# Patient Record
Sex: FEMALE | Race: BLACK OR AFRICAN AMERICAN | NOT HISPANIC OR LATINO | Employment: UNEMPLOYED | URBAN - METROPOLITAN AREA
[De-identification: names, ages, dates, MRNs, and addresses within clinical notes are randomized per-mention and may not be internally consistent; named-entity substitution may affect disease eponyms.]

---

## 2018-08-17 ENCOUNTER — TELEPHONE (OUTPATIENT)
Dept: OBSTETRICS AND GYNECOLOGY | Facility: CLINIC | Age: 23
End: 2018-08-17

## 2018-08-17 DIAGNOSIS — Z34.90 PREGNANCY, UNSPECIFIED GESTATIONAL AGE: Primary | ICD-10-CM

## 2018-08-17 NOTE — TELEPHONE ENCOUNTER
----- Message from Promise Buckley sent at 8/17/2018  1:54 PM CDT -----  Contact: self   Pt is calling to schedule a new pregnancy appt. LMP:07/01/18 PPT:08/14/18. the pt can be reached at [phone].

## 2018-09-05 ENCOUNTER — OFFICE VISIT (OUTPATIENT)
Dept: OBSTETRICS AND GYNECOLOGY | Facility: CLINIC | Age: 23
End: 2018-09-05
Payer: MEDICAID

## 2018-09-05 ENCOUNTER — LAB VISIT (OUTPATIENT)
Dept: LAB | Facility: OTHER | Age: 23
End: 2018-09-05
Payer: MEDICAID

## 2018-09-05 VITALS
BODY MASS INDEX: 39.67 KG/M2 | DIASTOLIC BLOOD PRESSURE: 70 MMHG | HEIGHT: 65 IN | WEIGHT: 238.13 LBS | SYSTOLIC BLOOD PRESSURE: 100 MMHG

## 2018-09-05 DIAGNOSIS — O99.211 OBESITY AFFECTING PREGNANCY IN FIRST TRIMESTER: ICD-10-CM

## 2018-09-05 DIAGNOSIS — Z32.01 POSITIVE PREGNANCY TEST: ICD-10-CM

## 2018-09-05 DIAGNOSIS — N91.2 AMENORRHEA: Primary | ICD-10-CM

## 2018-09-05 DIAGNOSIS — N91.2 AMENORRHEA: ICD-10-CM

## 2018-09-05 DIAGNOSIS — O98.111 SYPHILIS AFFECTING PREGNANCY IN FIRST TRIMESTER: ICD-10-CM

## 2018-09-05 LAB
ABO + RH BLD: NORMAL
ANION GAP SERPL CALC-SCNC: 12 MMOL/L
B-HCG UR QL: POSITIVE
BASOPHILS # BLD AUTO: 0 K/UL
BASOPHILS NFR BLD: 0 %
BLD GP AB SCN CELLS X3 SERPL QL: NORMAL
BUN SERPL-MCNC: 6 MG/DL
CALCIUM SERPL-MCNC: 9.8 MG/DL
CHLORIDE SERPL-SCNC: 105 MMOL/L
CO2 SERPL-SCNC: 19 MMOL/L
CREAT SERPL-MCNC: 0.7 MG/DL
CTP QC/QA: YES
DIFFERENTIAL METHOD: NORMAL
EOSINOPHIL # BLD AUTO: 0.1 K/UL
EOSINOPHIL NFR BLD: 1 %
ERYTHROCYTE [DISTWIDTH] IN BLOOD BY AUTOMATED COUNT: 13.2 %
EST. GFR  (AFRICAN AMERICAN): >60 ML/MIN/1.73 M^2
EST. GFR  (NON AFRICAN AMERICAN): >60 ML/MIN/1.73 M^2
ESTIMATED AVG GLUCOSE: 103 MG/DL
GLUCOSE SERPL-MCNC: 78 MG/DL
HBA1C MFR BLD HPLC: 5.2 %
HCT VFR BLD AUTO: 37.4 %
HGB BLD-MCNC: 12.5 G/DL
LYMPHOCYTES # BLD AUTO: 1.4 K/UL
LYMPHOCYTES NFR BLD: 21.1 %
MCH RBC QN AUTO: 29.3 PG
MCHC RBC AUTO-ENTMCNC: 33.4 G/DL
MCV RBC AUTO: 88 FL
MONOCYTES # BLD AUTO: 0.6 K/UL
MONOCYTES NFR BLD: 8.8 %
NEUTROPHILS # BLD AUTO: 4.7 K/UL
NEUTROPHILS NFR BLD: 68.8 %
PLATELET # BLD AUTO: 321 K/UL
PMV BLD AUTO: 10.1 FL
POTASSIUM SERPL-SCNC: 3.6 MMOL/L
RBC # BLD AUTO: 4.26 M/UL
SODIUM SERPL-SCNC: 136 MMOL/L
WBC # BLD AUTO: 6.84 K/UL

## 2018-09-05 PROCEDURE — 86703 HIV-1/HIV-2 1 RESULT ANTBDY: CPT

## 2018-09-05 PROCEDURE — 99203 OFFICE O/P NEW LOW 30 MIN: CPT | Mod: TH,S$PBB,, | Performed by: NURSE PRACTITIONER

## 2018-09-05 PROCEDURE — 85025 COMPLETE CBC W/AUTO DIFF WBC: CPT

## 2018-09-05 PROCEDURE — 36415 COLL VENOUS BLD VENIPUNCTURE: CPT

## 2018-09-05 PROCEDURE — 83021 HEMOGLOBIN CHROMOTOGRAPHY: CPT

## 2018-09-05 PROCEDURE — 87340 HEPATITIS B SURFACE AG IA: CPT

## 2018-09-05 PROCEDURE — 87491 CHLMYD TRACH DNA AMP PROBE: CPT

## 2018-09-05 PROCEDURE — 81025 URINE PREGNANCY TEST: CPT | Mod: PBBFAC | Performed by: NURSE PRACTITIONER

## 2018-09-05 PROCEDURE — 87086 URINE CULTURE/COLONY COUNT: CPT

## 2018-09-05 PROCEDURE — 86592 SYPHILIS TEST NON-TREP QUAL: CPT

## 2018-09-05 PROCEDURE — 83036 HEMOGLOBIN GLYCOSYLATED A1C: CPT

## 2018-09-05 PROCEDURE — 99213 OFFICE O/P EST LOW 20 MIN: CPT | Mod: PBBFAC,TH,25 | Performed by: NURSE PRACTITIONER

## 2018-09-05 PROCEDURE — 86762 RUBELLA ANTIBODY: CPT

## 2018-09-05 PROCEDURE — 80048 BASIC METABOLIC PNL TOTAL CA: CPT

## 2018-09-05 PROCEDURE — 99999 PR PBB SHADOW E&M-EST. PATIENT-LVL III: CPT | Mod: PBBFAC,,, | Performed by: NURSE PRACTITIONER

## 2018-09-05 PROCEDURE — 86850 RBC ANTIBODY SCREEN: CPT

## 2018-09-05 NOTE — PROGRESS NOTES
"  CC: Positive Pregnancy Test    HISTORY OF PRESENT ILLNESS:    Noemí Elias is a 23 y.o. female, ,  Presents today for a routine exam complaining of amenorrhea and positive home urine pregnancy test.  Patient's last menstrual period was 2018. (approximate date). Hx of irregular periods. Reports LMP was "weird and only spotting". Very excited about pregnancy but also nervous. This is her first pregnancy. Plans to see Dr. Reynolds for the pregnancy per her PCP recommendations. Recently seen at her PCP office in [facility]. She had a full STD panel and pap smear done. Dx with syphilis. She and her SO are in process of being treated. She received her second injection yesterday, the third is scheduled for next week. Denies hx of other STDs. Pap smear was normal. Only taking a prenatal vitamin. Does not smoke cigarettes. Denies PMH or PSH. Denies nausea. Reports breast tenderness. Denies pelvic pain.    LMP: 18  EGA: 9w6d  EDC: 19    ROS:  GENERAL: No weight changes. No swelling. No fatigue. No fever.  CARDIOVASCULAR: No chest pain. No shortness of breath. No leg cramps.   NEUROLOGICAL: No headaches. No vision changes.  BREASTS: No pain. No lumps. No discharge.  ABDOMEN: No pain. No diarrhea. No constipation.  REPRODUCTIVE: No abnormal bleeding.   VULVA: No pain. No lesions. No itching.  VAGINA: No relaxation. No itching. No odor. No discharge. No lesions.  URINARY: No incontinence. No nocturia. No frequency. No dysuria.    MEDICATIONS AND ALLERGIES:  Reviewed    COMPREHENSIVE GYN HISTORY:  PAP History: Denies abnormal Paps.  Infection History:syphilis. Denies PID.  Benign History: Denies uterine fibroids. Denies ovarian cysts. Denies endometriosis. Denies other conditions.  Cancer History: Denies cervical cancer. Denies uterine cancer or hyperplasia. Denies ovarian cancer. Denies vulvar cancer or pre-cancer. Denies vaginal cancer or pre-cancer. Denies breast cancer. Denies colon cancer.  Sexual Activity " "History: Reports currently being sexually active  Menstrual History: None.  Contraception: None    /70   Ht 5' 5" (1.651 m)   Wt 108 kg (238 lb 1.6 oz)   LMP 2018   BMI 39.62 kg/m²     PE:  AFFECT: Calm, alert and oriented X 3. Interactive during exam  GENERAL: Appears well-nourished, well-developed, in no acute distress.  HEAD: Normocephalic, atruamatic  TEETH: Good dentition.  THYROID: No thyromegally   BREASTS: No masses, skin changes, nipple discharge or adenopathy bilaterally.  SKIN: Normal for race, warm, & dry. No lesions or rashes.  LUNGS: Easy and unlabored, clear to auscultation bilaterally.  HEART: Regular rate and rhythm   ABDOMEN: Soft and nontender without masses or organomegally.  VULVA: No lesions, masses or tenderness.  VAGINA: Moist and well rugated without lesions or discharge.  CERVIX: Moist and pink without lesions, discharge or tenderness.      UTERUS SIZE: 8 week size, nontender and without masses.  ADNEXA: No masses or tenderness.  ESTIMATE OF PELVIC CAPACITY: Adequate  EXTREMITIES: No cyanosis, clubbing or edema. No calf tenderness.  LYMPH NODES: No axillary or inguinal adenopathy.    PROCEDURES:  UPT Positive  Genprobe  Pap current, 2018 WNL per pt report    ASSESSMENT/PLAN:  Amenorrhea  Positive urine pregnancy test (TOAN: 19, EGA: 9w6d based on LMP)    -  Routine prenatal care    Nausea and vomiting in pregnancy    -  Education regarding lifestyle and dietary modifications    -  Advised use of B6/Unisom. Pt will notify us if no relief/worsening symptoms, will consider Zofran if needed.    1st TRIMESTER COUNSELING: Discussed all, booklet provided:  Common complaints of pregnancy  HIV and other routine prenatal tests including  genetic screening  Risk factors identified by prenatal history  Oriented to practice - discussed anticipated course of prenatal care & indications for Ultrasound  Childbirth classes/Hospital facilities   Nutrition and weight gain " counseling  Toxoplasmosis precautions (Cats/Raw Meat)  Sexual activity and exercise  Environmental/Work hazards  Travel  Tobacco (Ask, Advise, Assess, Assist, and Arrange), as well as alcohol and drug use  Use of any medications (Including supplements, Vitamins, Herbs, or OTC Drugs)  Domestic violence  Seat belt use    FOLLOW-UP in 4 weeks with Dr. Rodolfo Powell today, u/s tomorrow  Pap current  Release of records initiated today  Continue with OTC prenatal  PCN injection #3 next week     Tia Sanchez NP  OB/GYN

## 2018-09-06 LAB
C TRACH DNA SPEC QL NAA+PROBE: NOT DETECTED
HBV SURFACE AG SERPL QL IA: NEGATIVE
HIV 1+2 AB+HIV1 P24 AG SERPL QL IA: NEGATIVE
N GONORRHOEA DNA SPEC QL NAA+PROBE: NOT DETECTED
RPR SER QL: NORMAL
RUBV IGG SER-ACNC: 22.6 IU/ML
RUBV IGG SER-IMP: REACTIVE

## 2018-09-07 ENCOUNTER — TELEPHONE (OUTPATIENT)
Dept: OBSTETRICS AND GYNECOLOGY | Facility: CLINIC | Age: 23
End: 2018-09-07

## 2018-09-07 ENCOUNTER — PROCEDURE VISIT (OUTPATIENT)
Dept: OBSTETRICS AND GYNECOLOGY | Facility: CLINIC | Age: 23
End: 2018-09-07
Payer: MEDICAID

## 2018-09-07 DIAGNOSIS — Z34.90 PREGNANCY, UNSPECIFIED GESTATIONAL AGE: ICD-10-CM

## 2018-09-07 LAB
BACTERIA UR CULT: NORMAL
HGB A2 MFR BLD HPLC: 2.9 %
HGB FRACT BLD ELPH-IMP: NORMAL
HGB FRACT BLD ELPH-IMP: NORMAL

## 2018-09-07 PROCEDURE — 76801 OB US < 14 WKS SINGLE FETUS: CPT | Mod: 26,S$PBB,, | Performed by: OBSTETRICS & GYNECOLOGY

## 2018-09-07 PROCEDURE — 76801 OB US < 14 WKS SINGLE FETUS: CPT | Mod: PBBFAC | Performed by: OBSTETRICS & GYNECOLOGY

## 2018-09-07 NOTE — TELEPHONE ENCOUNTER
----- Message from Trinidad Escalona sent at 9/7/2018 11:34 AM CDT -----  Contact: Mariama  Name of Who is Calling: Mariama     What is the request in detail: Patients mother Mariama is requesting to speak with the staff in regards to getting a new set of U/S pics from the patients dating U/S visit today she states they misplaced them on the way home......Please contact to further discuss and advise     Can the clinic reply by [facility]: No      What Number to Call Back if not in [facility]: [phone]

## 2018-10-02 ENCOUNTER — INITIAL PRENATAL (OUTPATIENT)
Dept: OBSTETRICS AND GYNECOLOGY | Facility: CLINIC | Age: 23
End: 2018-10-02
Payer: MEDICAID

## 2018-10-02 VITALS — BODY MASS INDEX: 40.36 KG/M2 | WEIGHT: 242.5 LBS | SYSTOLIC BLOOD PRESSURE: 100 MMHG | DIASTOLIC BLOOD PRESSURE: 60 MMHG

## 2018-10-02 DIAGNOSIS — Z34.02 ENCOUNTER FOR SUPERVISION OF NORMAL FIRST PREGNANCY IN SECOND TRIMESTER: Primary | ICD-10-CM

## 2018-10-02 DIAGNOSIS — F12.91 HISTORY OF MARIJUANA USE: ICD-10-CM

## 2018-10-02 LAB
AMPHET+METHAMPHET UR QL: NEGATIVE
BARBITURATES UR QL SCN>200 NG/ML: NEGATIVE
BENZODIAZ UR QL SCN>200 NG/ML: NEGATIVE
BZE UR QL SCN: NEGATIVE
CANNABINOIDS UR QL SCN: NORMAL
CREAT UR-MCNC: 141 MG/DL
ETHANOL UR-MCNC: <10 MG/DL
METHADONE UR QL SCN>300 NG/ML: NEGATIVE
OPIATES UR QL SCN: NEGATIVE
PCP UR QL SCN>25 NG/ML: NEGATIVE
TOXICOLOGY INFORMATION: NORMAL

## 2018-10-02 PROCEDURE — 99212 OFFICE O/P EST SF 10 MIN: CPT | Mod: PBBFAC,TH | Performed by: OBSTETRICS & GYNECOLOGY

## 2018-10-02 PROCEDURE — 99999 PR PBB SHADOW E&M-EST. PATIENT-LVL II: CPT | Mod: PBBFAC,,, | Performed by: OBSTETRICS & GYNECOLOGY

## 2018-10-02 PROCEDURE — 80307 DRUG TEST PRSMV CHEM ANLYZR: CPT

## 2018-10-02 PROCEDURE — 99213 OFFICE O/P EST LOW 20 MIN: CPT | Mod: TH,S$PBB,, | Performed by: OBSTETRICS & GYNECOLOGY

## 2018-10-02 NOTE — PROGRESS NOTES
Labs reviewed. Patient reports feeling well. Repost NICOLE use 1 week ago, infomed patient that tox screen would be performed.

## 2018-10-11 ENCOUNTER — TELEPHONE (OUTPATIENT)
Dept: OBSTETRICS AND GYNECOLOGY | Facility: CLINIC | Age: 23
End: 2018-10-11

## 2018-10-11 NOTE — TELEPHONE ENCOUNTER
----- Message from [place] sent at 10/11/2018  9:37 AM CDT -----  PLEASE CALL 15 WKS OB PT THIS MORNING PT WAS VOMITING AND SHE SAW BLOOD [phone]

## 2018-10-11 NOTE — TELEPHONE ENCOUNTER
I spoke to the pt and gave her the pre cautions per Dr Reynolds  About vomiting and  If she does more than a 24 hours period  she needs to come into labor and delivery . I advised the pt to eat six small meals and drink her fluids slowly and not gulp it down.  Pt states she only threw up once today with a streak of blood. Pt was told to monitor and if the condition gets worse to come into labor and delivery to be seen.

## 2018-10-16 NOTE — TELEPHONE ENCOUNTER
----- Message from Ernestine Amezcua sent at 10/16/2018  9:52 AM CDT -----  Contact: Pt   Name of Who is Calling: ANDREW GUTIERREZ [[id number]]    What is the request in detail: Pt states that her generic Zofran nausea medicine isn't working for her and it gave her headaches. She states she took Promethizine 25 MG and it worked and she would like a prescription for it. Please advise.    Can the clinic reply by [facility]: No     [facility]: [phone]

## 2018-10-16 NOTE — TELEPHONE ENCOUNTER
Patient states zofran makes her constipated and gives her headaches. Patient requesting rx for promethazine.

## 2018-10-17 ENCOUNTER — TELEPHONE (OUTPATIENT)
Dept: OBSTETRICS AND GYNECOLOGY | Facility: CLINIC | Age: 23
End: 2018-10-17

## 2018-10-17 RX ORDER — PROMETHAZINE HYDROCHLORIDE 25 MG/1
25 TABLET ORAL EVERY 4 HOURS
Qty: 30 TABLET | Refills: 0 | Status: SHIPPED | OUTPATIENT
Start: 2018-10-17 | End: 2018-11-28 | Stop reason: SDUPTHER

## 2018-10-17 NOTE — TELEPHONE ENCOUNTER
----- Message from Fernanda Fields MA sent at 10/17/2018 10:56 AM CDT -----  Contact: Self  Pt is returning a phone call. The pt can be reached at [phone].  Thanks FL

## 2018-10-17 NOTE — TELEPHONE ENCOUNTER
Left message to patient to call the office at [phone].  Informed patient medication was sent to the pharmacy.

## 2018-10-31 ENCOUNTER — ROUTINE PRENATAL (OUTPATIENT)
Dept: OBSTETRICS AND GYNECOLOGY | Facility: CLINIC | Age: 23
End: 2018-10-31
Payer: MEDICAID

## 2018-10-31 ENCOUNTER — HOSPITAL ENCOUNTER (EMERGENCY)
Facility: OTHER | Age: 23
Discharge: HOME | End: 2018-10-31
Attending: OBSTETRICS & GYNECOLOGY | Admitting: OBSTETRICS & GYNECOLOGY
Payer: MEDICAID

## 2018-10-31 ENCOUNTER — TELEPHONE (OUTPATIENT)
Dept: OBSTETRICS AND GYNECOLOGY | Facility: CLINIC | Age: 23
End: 2018-10-31

## 2018-10-31 VITALS
TEMPERATURE: 99 F | SYSTOLIC BLOOD PRESSURE: 116 MMHG | HEART RATE: 73 BPM | RESPIRATION RATE: 20 BRPM | DIASTOLIC BLOOD PRESSURE: 75 MMHG | OXYGEN SATURATION: 98 %

## 2018-10-31 VITALS
DIASTOLIC BLOOD PRESSURE: 60 MMHG | SYSTOLIC BLOOD PRESSURE: 100 MMHG | WEIGHT: 238.13 LBS | BODY MASS INDEX: 39.62 KG/M2

## 2018-10-31 DIAGNOSIS — Z34.02 ENCOUNTER FOR SUPERVISION OF NORMAL FIRST PREGNANCY IN SECOND TRIMESTER: Primary | ICD-10-CM

## 2018-10-31 DIAGNOSIS — Z3A.17 17 WEEKS GESTATION OF PREGNANCY: ICD-10-CM

## 2018-10-31 DIAGNOSIS — O21.9 NAUSEA AND VOMITING OF PREGNANCY, ANTEPARTUM: Primary | ICD-10-CM

## 2018-10-31 DIAGNOSIS — O26.892 HEADACHE IN PREGNANCY, ANTEPARTUM, SECOND TRIMESTER: ICD-10-CM

## 2018-10-31 DIAGNOSIS — R51.9 HEADACHE IN PREGNANCY, ANTEPARTUM, SECOND TRIMESTER: ICD-10-CM

## 2018-10-31 PROCEDURE — 99212 OFFICE O/P EST SF 10 MIN: CPT | Mod: PBBFAC,TH | Performed by: OBSTETRICS & GYNECOLOGY

## 2018-10-31 PROCEDURE — 25000003 PHARM REV CODE 250: Performed by: OBSTETRICS & GYNECOLOGY

## 2018-10-31 PROCEDURE — 99284 EMERGENCY DEPT VISIT MOD MDM: CPT | Mod: ,,, | Performed by: OBSTETRICS & GYNECOLOGY

## 2018-10-31 PROCEDURE — 99283 EMERGENCY DEPT VISIT LOW MDM: CPT | Mod: 27

## 2018-10-31 PROCEDURE — 99213 OFFICE O/P EST LOW 20 MIN: CPT | Mod: TH,S$PBB,, | Performed by: OBSTETRICS & GYNECOLOGY

## 2018-10-31 PROCEDURE — 25000003 PHARM REV CODE 250: Performed by: STUDENT IN AN ORGANIZED HEALTH CARE EDUCATION/TRAINING PROGRAM

## 2018-10-31 PROCEDURE — 99999 PR PBB SHADOW E&M-EST. PATIENT-LVL II: CPT | Mod: PBBFAC,,, | Performed by: OBSTETRICS & GYNECOLOGY

## 2018-10-31 RX ORDER — ONDANSETRON 8 MG/1
8 TABLET, ORALLY DISINTEGRATING ORAL ONCE
Status: DISCONTINUED | OUTPATIENT
Start: 2018-10-31 | End: 2018-10-31

## 2018-10-31 RX ORDER — ACETAMINOPHEN 500 MG
1000 TABLET ORAL EVERY 6 HOURS PRN
Status: DISCONTINUED | OUTPATIENT
Start: 2018-10-31 | End: 2018-10-31

## 2018-10-31 RX ORDER — BUTALBITAL, ACETAMINOPHEN AND CAFFEINE 50; 325; 40 MG/1; MG/1; MG/1
1 TABLET ORAL EVERY 6 HOURS PRN
Qty: 40 TABLET | Refills: 0 | Status: SHIPPED | OUTPATIENT
Start: 2018-10-31 | End: 2018-11-28 | Stop reason: SDUPTHER

## 2018-10-31 RX ORDER — METOCLOPRAMIDE 10 MG/1
10 TABLET ORAL ONCE
Status: COMPLETED | OUTPATIENT
Start: 2018-10-31 | End: 2018-10-31

## 2018-10-31 RX ORDER — BUTALBITAL, ACETAMINOPHEN AND CAFFEINE 50; 325; 40 MG/1; MG/1; MG/1
1 TABLET ORAL EVERY 6 HOURS PRN
Qty: 40 TABLET | Refills: 0 | Status: SHIPPED | OUTPATIENT
Start: 2018-10-31 | End: 2018-10-31 | Stop reason: SDUPTHER

## 2018-10-31 RX ORDER — METOCLOPRAMIDE 10 MG/1
10 TABLET ORAL EVERY 6 HOURS
Qty: 30 TABLET | Refills: 0 | Status: SHIPPED | OUTPATIENT
Start: 2018-10-31 | End: 2018-10-31 | Stop reason: CLARIF

## 2018-10-31 RX ORDER — BUTALBITAL, ACETAMINOPHEN AND CAFFEINE 50; 325; 40 MG/1; MG/1; MG/1
2 TABLET ORAL EVERY 6 HOURS PRN
Status: DISCONTINUED | OUTPATIENT
Start: 2018-10-31 | End: 2018-10-31 | Stop reason: HOSPADM

## 2018-10-31 RX ADMIN — BUTALBITAL, ACETAMINOPHEN AND CAFFEINE 2 TABLET: 50; 325; 40 TABLET ORAL at 06:10

## 2018-10-31 RX ADMIN — METOCLOPRAMIDE HYDROCHLORIDE 10 MG: 10 TABLET ORAL at 06:10

## 2018-10-31 NOTE — TELEPHONE ENCOUNTER
I spoke to the pt and  Scheduled her an apt to come in and be seen for throwing up blood and severe headaches that do not go away.

## 2018-10-31 NOTE — ED PROVIDER NOTES
Encounter Date: 10/31/2018       History     Chief Complaint   Patient presents with    Nausea     Noemí Elias is a 23 y.o. F at 17w6d presents complaining of nausea and emesis x2 days. Patient states that on 10/30 she had 4 episodes of emesis. She has had 3 episodes of emesis today, the last being around 1500.  Her n/v is unrelieved with phenergan. She also states that she developed a mild frontal headache yesterday that is unrelieved with tylenol. She continues to have a headache today and last took tylenol early this morning. She is worried due to a very small amount of bright red blood in her emesis yesterday that she states is now about a tablespoon's worth. She reports one episode of diarrhea this morning. She last ate dinner on 10/29 and has not been able to keep food down since. Currently in the [facility], she denies feeling nauseous and only complains of slight headache. She denies fever, chills, chest pain, shortness of breath, and dysuria. She states that she cannot take zofran due to side effects of constipation and headache.    This IUP is complicated by treatment of syphilis in the 1st trimester, morbid obesity, and history of marijuana use in the 1st trimester.  Patient denies contractions, denies vaginal bleeding, denies LOF.   Fetal Movement: normal.            Review of patient's allergies indicates:   Allergen Reactions    Zithromax [azithromycin]      No past medical history on file.  No past surgical history on file.  Family History   Problem Relation Age of Onset    Breast cancer Mother     Colon cancer Neg Hx     Ovarian cancer Neg Hx      Social History     Tobacco Use    Smoking status: Former Smoker    Smokeless tobacco: Former User   Substance Use Topics    Alcohol use: No     Frequency: Never    Drug use: Yes     Types: Marijuana     Review of Systems   Constitutional: Negative for activity change, appetite change, chills, fatigue and fever.   HENT: Negative for congestion and  rhinorrhea.    Eyes: Negative for visual disturbance.   Respiratory: Negative for cough and shortness of breath.    Cardiovascular: Negative for chest pain and palpitations.   Gastrointestinal: Positive for diarrhea, nausea and vomiting. Negative for abdominal pain.   Genitourinary: Negative for dysuria, pelvic pain and vaginal bleeding.   Musculoskeletal: Negative for back pain.   Neurological: Positive for headaches. Negative for dizziness.   Psychiatric/Behavioral: Negative for agitation.       Physical Exam     Initial Vitals   BP Pulse Resp Temp SpO2   10/31/18 1711 10/31/18 1711 10/31/18 1717 10/31/18 1710 10/31/18 1712   117/72 85 20 99 °F (37.2 °C) 99 %      MAP       --                Physical Exam    Vitals reviewed.  Constitutional: She appears well-developed and well-nourished. No distress.   Cardiovascular: Normal rate, regular rhythm and normal heart sounds.   Pulmonary/Chest: No respiratory distress.   Abdominal: Soft. There is no tenderness. There is no rebound and no guarding.   Gravid. Fundus NT.   Musculoskeletal: She exhibits no edema or tenderness.   Neurological: She is alert and oriented to person, place, and time.   Skin: Skin is warm and dry.   Psychiatric: She has a normal mood and affect. Her behavior is normal. Judgment and thought content normal.         ED Course   Procedures   FETAL HEART TONES VERIFIED  Labs Reviewed - No data to display       Imaging Results    None          Medical Decision Making:   ED Management:  - VSS  - FHTs in 150s  - U dip: negative  - Tylenol 1g PO given for headache  - Not currently feeling nauseous, will PO challenge  - 1 episode of emesis after eating part of a ros cracker and drinking sprite  - Prior to administering reglan, pt was eating chips and felt better. Nausea/vomiting resolved  - fioricet for headache w complete relief  - dc home w rx for fioricet              Attending Attestation:   Physician Attestation Statement for Resident:  As the  supervising MD   Physician Attestation Statement: I have personally seen and examined this patient.   I agree with the above history. -:   As the supervising MD I agree with the above PE.    As the supervising MD I agree with the above treatment, course, plan, and disposition.   -: Patient evaluated and found to be stable, agree with resident's assessment of headache responsive to Fioricet with no s/s pre-eclampsia and plan to discharge to home with precautions.  I was personally present during the critical portions of the procedure(s) performed by the resident and was immediately available in the ED to provide services and assistance as needed during the entire procedure.  I have reviewed the following: old records at this facility.                       Clinical Impression:   The primary encounter diagnosis was Nausea and vomiting of pregnancy, antepartum. A diagnosis of 17 weeks gestation of pregnancy was also pertinent to this visit.      Disposition:   Disposition: Discharged  Condition: Stable         Bam Judge MD  PGY2, OBGYN [facility]                 Bam Judge MD  Resident  10/31/18 5841       Sofie Alfred MD  10/31/18 3119

## 2018-10-31 NOTE — TELEPHONE ENCOUNTER
----- Message from Clotilde Thurman sent at 10/31/2018  1:12 PM CDT -----  Contact: pt            Name of Who is Calling: Noemí      What is the request in detail: pt call to inform that she has been throwing up blood again and its more than the last time. Ans she would also like to get a couple of months worth of prenatal vitamins. Please call and advise       Can the clinic reply by [facility]: no      What Number to Call Back if not in [facility]: [phone]

## 2018-11-01 NOTE — TELEPHONE ENCOUNTER
I spoke to the pt and informed her that she will have to call her insurance company and see what pills are covered by her plan

## 2018-11-01 NOTE — PROGRESS NOTES
Patient presents to clinic with a history of to day history of persistent nausea and vomiting.  She also reports an intractable headache unrelieved by Tylenol.  She denies any trauma or abdominal pain or vaginal bleeding.

## 2018-11-05 ENCOUNTER — TELEPHONE (OUTPATIENT)
Dept: OBSTETRICS AND GYNECOLOGY | Facility: CLINIC | Age: 23
End: 2018-11-05

## 2018-11-05 NOTE — TELEPHONE ENCOUNTER
----- Message from Genna Proctor sent at 11/5/2018 12:12 PM CST -----  Contact: Mickie ([facility] rep)   Name of Who is Calling: Mickie ([facility] rep)       What is the request in detail: Mickie ([facility]) is requesting a call in regards to prescription that was sent on 10- she states insurance doesn't cover and needs authorization to change....Please contact to further discuss and advise.       Can the clinic reply by [facility]: no       What Number to Call Back [facility] not in [facility]: [phone]

## 2018-11-05 NOTE — TELEPHONE ENCOUNTER
Spoke to Mickie from [facility]. The prenatals sent are not covered by the patient's insurance. Informed patient that there was another brand available however it would not have DHA. Offered option of otc prenatals or the brand covered by insurance. Patient stated she was fine with prenatals covered by her insurance. Confirmed with pharmacy.

## 2018-11-08 ENCOUNTER — PROCEDURE VISIT (OUTPATIENT)
Dept: MATERNAL FETAL MEDICINE | Facility: CLINIC | Age: 23
End: 2018-11-08
Payer: MEDICAID

## 2018-11-08 DIAGNOSIS — Z36.89 ENCOUNTER FOR FETAL ANATOMIC SURVEY: ICD-10-CM

## 2018-11-08 DIAGNOSIS — Z34.02 ENCOUNTER FOR SUPERVISION OF NORMAL FIRST PREGNANCY IN SECOND TRIMESTER: ICD-10-CM

## 2018-11-08 DIAGNOSIS — O99.210 MATERNAL OBESITY AFFECTING PREGNANCY, ANTEPARTUM: ICD-10-CM

## 2018-11-08 PROCEDURE — 99499 UNLISTED E&M SERVICE: CPT | Mod: S$PBB,,, | Performed by: OBSTETRICS & GYNECOLOGY

## 2018-11-08 PROCEDURE — 76811 OB US DETAILED SNGL FETUS: CPT | Mod: 26,S$PBB,, | Performed by: OBSTETRICS & GYNECOLOGY

## 2018-11-08 PROCEDURE — 76811 OB US DETAILED SNGL FETUS: CPT | Mod: PBBFAC | Performed by: OBSTETRICS & GYNECOLOGY

## 2018-11-28 ENCOUNTER — TELEPHONE (OUTPATIENT)
Dept: OBSTETRICS AND GYNECOLOGY | Facility: CLINIC | Age: 23
End: 2018-11-28

## 2018-11-28 NOTE — TELEPHONE ENCOUNTER
Pt will call the office back with the name of the prenatal vitamins that will be covered by her insurance company.

## 2018-11-28 NOTE — TELEPHONE ENCOUNTER
----- Message from Genna Proctor sent at 11/28/2018 11:54 AM CST -----  Contact: pt   Name of Who is Calling: ANDREW GUTIERREZ [[id number]]       What is the request in detail:   Patient is requesting a call in regards to medication her insurance will cover she states the prenatal vitamins needs to be 28mg and for the iron pills they need to be .8 mg to be covered....Please contact to further discuss and advise.       Can the clinic reply by [facility]: no       What Number to Call Back if not in [facility]: [phone]

## 2018-11-30 RX ORDER — PROMETHAZINE HYDROCHLORIDE 25 MG/1
25 TABLET ORAL EVERY 4 HOURS
Qty: 30 TABLET | Refills: 0 | Status: SHIPPED | OUTPATIENT
Start: 2018-11-30 | End: 2019-01-07 | Stop reason: SDUPTHER

## 2018-11-30 RX ORDER — BUTALBITAL, ACETAMINOPHEN AND CAFFEINE 50; 325; 40 MG/1; MG/1; MG/1
1 TABLET ORAL EVERY 6 HOURS PRN
Qty: 40 TABLET | Refills: 0 | Status: SHIPPED | OUTPATIENT
Start: 2018-11-30 | End: 2018-12-20

## 2018-12-03 ENCOUNTER — ROUTINE PRENATAL (OUTPATIENT)
Dept: OBSTETRICS AND GYNECOLOGY | Facility: CLINIC | Age: 23
End: 2018-12-03
Payer: MEDICAID

## 2018-12-03 VITALS
BODY MASS INDEX: 41.57 KG/M2 | WEIGHT: 249.81 LBS | DIASTOLIC BLOOD PRESSURE: 84 MMHG | SYSTOLIC BLOOD PRESSURE: 122 MMHG

## 2018-12-03 DIAGNOSIS — Z3A.22 22 WEEKS GESTATION OF PREGNANCY: Primary | ICD-10-CM

## 2018-12-03 PROCEDURE — 99213 OFFICE O/P EST LOW 20 MIN: CPT | Mod: PBBFAC | Performed by: NURSE PRACTITIONER

## 2018-12-03 PROCEDURE — 99999 PR PBB SHADOW E&M-EST. PATIENT-LVL III: CPT | Mod: PBBFAC,,, | Performed by: NURSE PRACTITIONER

## 2018-12-03 PROCEDURE — 99212 OFFICE O/P EST SF 10 MIN: CPT | Mod: TH,S$PBB,, | Performed by: NURSE PRACTITIONER

## 2018-12-03 NOTE — PROGRESS NOTES
Here for routine OB appt at 22w4d, with no complaints.  Denies VB and cramping.+ FM. Found out it's a GIRL, namin her Hartlee. Plans to try breast feeding. Unsure of peds, list given today. Undecided on epidural. Interested in prenatal classes/tours, information given today. Discussed need for glucola. Pain, bleeding, and PTL precautions given.  F/U scheduled 3 weeks  GTT and RPR at next visit

## 2018-12-18 ENCOUNTER — TELEPHONE (OUTPATIENT)
Dept: OBSTETRICS AND GYNECOLOGY | Facility: CLINIC | Age: 23
End: 2018-12-18

## 2018-12-18 NOTE — TELEPHONE ENCOUNTER
----- Message from Blank Ricardo sent at 12/18/2018  1:37 PM CST -----  Contact: ANDREW GUTIERREZ [[id number]]            Name of Who is Calling: ANDREW GUTIERREZ [[id number]]    What is the request in detail:patient would like to reschedule routine ob appt. Please call     Can the clinic reply by [facility]: no     What Number to Call Back [facility] not [facility]: [phone]

## 2019-01-07 NOTE — TELEPHONE ENCOUNTER
----- Message from Neva Connelly sent at 1/7/2019  2:18 PM CST -----  Contact: ANDREW GUTIERREZ [[id number]]  Can the clinic reply in [facility]: N    Please refill the medication(s) listed below.     Please call the patient when the prescription(s) is ready for  at the phone number   [phone]    Medication #1:promethazine (PHENERGAN) 25 MG tablet    Medication #2: butalbital-acetaminophen-caffeine -40 mg (FIORICET, ESGIC) -40 mg per tablet    Preferred Pharmacy: [facility] [address] [place] [address] [phone]

## 2019-01-08 ENCOUNTER — TELEPHONE (OUTPATIENT)
Dept: OBSTETRICS AND GYNECOLOGY | Facility: CLINIC | Age: 24
End: 2019-01-08

## 2019-01-08 RX ORDER — BUTALBITAL, ACETAMINOPHEN AND CAFFEINE 50; 325; 40 MG/1; MG/1; MG/1
1 TABLET ORAL EVERY 4 HOURS PRN
Qty: 30 TABLET | Refills: 3 | Status: CANCELLED | OUTPATIENT
Start: 2019-01-08 | End: 2019-02-07

## 2019-01-08 RX ORDER — PROMETHAZINE HYDROCHLORIDE 25 MG/1
25 TABLET ORAL EVERY 4 HOURS
Qty: 30 TABLET | Refills: 0 | Status: SHIPPED | OUTPATIENT
Start: 2019-01-08 | End: 2019-03-12 | Stop reason: SDUPTHER

## 2019-01-08 NOTE — TELEPHONE ENCOUNTER
----- Message from Rica Joiner sent at 1/8/2019 10:55 AM CST -----  Contact: self  Fax [phone]. This is Dentist fax number

## 2019-01-08 NOTE — TELEPHONE ENCOUNTER
----- Message from Ernestine Amezcua sent at 1/8/2019 10:32 AM CST -----  Contact: Pt  Name of Who is Calling: ANDREW GUTIERREZ [[id number]]    What is the request in detail: Pt want to know if she can get her teeth pulled today. She has an appt at noon. She cannot remember the name of the dental office but the number is [phone]. Please call to further discuss and advise.     Can the clinic reply by [facility]: No     What Number to Call Back [facility] not [facility]: [phone]

## 2019-01-08 NOTE — TELEPHONE ENCOUNTER
----- Message from Ernestine Amezcua sent at 1/8/2019 12:00 PM CST -----  Contact: Pt   Name of Who is Calling: ANDREW GUTIERREZ [[id number]]    What is the request in detail: Pt is at the dentist and states they have not received the clearance. Want to know can it be re-faxed. Please fax to [phone]. Please advise.     Can the clinic reply by [facility]: No     What Number to Call Back [facility] not [facility]: [phone]

## 2019-01-10 ENCOUNTER — HOSPITAL ENCOUNTER (EMERGENCY)
Facility: OTHER | Age: 24
Discharge: HOME | End: 2019-01-10
Payer: MEDICAID

## 2019-01-10 VITALS
HEART RATE: 84 BPM | OXYGEN SATURATION: 100 % | DIASTOLIC BLOOD PRESSURE: 69 MMHG | SYSTOLIC BLOOD PRESSURE: 117 MMHG | TEMPERATURE: 99 F | RESPIRATION RATE: 18 BRPM

## 2019-01-10 DIAGNOSIS — O26.893 ABDOMINAL PAIN DURING PREGNANCY IN THIRD TRIMESTER: Primary | ICD-10-CM

## 2019-01-10 DIAGNOSIS — Z3A.28 28 WEEKS GESTATION OF PREGNANCY: ICD-10-CM

## 2019-01-10 DIAGNOSIS — R10.9 ABDOMINAL PAIN DURING PREGNANCY IN THIRD TRIMESTER: Primary | ICD-10-CM

## 2019-01-10 PROCEDURE — 59025 FETAL NON-STRESS TEST: CPT

## 2019-01-10 PROCEDURE — 25000003 PHARM REV CODE 250: Performed by: STUDENT IN AN ORGANIZED HEALTH CARE EDUCATION/TRAINING PROGRAM

## 2019-01-10 PROCEDURE — 99284 EMERGENCY DEPT VISIT MOD MDM: CPT | Mod: 25

## 2019-01-10 RX ORDER — HYDROXYZINE PAMOATE 25 MG/1
25 CAPSULE ORAL ONCE
Status: COMPLETED | OUTPATIENT
Start: 2019-01-10 | End: 2019-01-10

## 2019-01-10 RX ORDER — ACETAMINOPHEN 500 MG
1000 TABLET ORAL ONCE
Status: COMPLETED | OUTPATIENT
Start: 2019-01-10 | End: 2019-01-10

## 2019-01-10 RX ADMIN — HYDROXYZINE PAMOATE 25 MG: 25 CAPSULE ORAL at 01:01

## 2019-01-10 RX ADMIN — ACETAMINOPHEN 1000 MG: 500 TABLET ORAL at 01:01

## 2019-01-10 NOTE — ED PROVIDER NOTES
"Encounter Date: 1/10/2019       History     Chief Complaint   Patient presents with    Nausea    Vomiting    Contractions     Noemí Elias is a 23 y.o.  at 28w0d who presents to the OB ED today (01/10/2019) with CC of abdominal pain. She was in the shower tonight, and when she got out she had sudden onset of abdominal pain. The pain has come and gone since then. She cannot describe it, but says it is at the top and bottom of her abdomen. She has no other associated symptoms. .  She reports no vaginal bleeding, no leakage of fluid, and possible contractions.   She reports good fetal movement.  This pregnancy is complicated by obesity, syphilis treated with PCN x 3 this year, and marijuana use in pregnancy.          Review of patient's allergies indicates:   Allergen Reactions    Zithromax [azithromycin]      No past medical history on file.  No past surgical history on file.  Family History   Problem Relation Age of Onset    Breast cancer Mother     Colon cancer Neg Hx     Ovarian cancer Neg Hx      Social History     Tobacco Use    Smoking status: Former Smoker    Smokeless tobacco: Former User   Substance Use Topics    Alcohol use: No     Frequency: Never    Drug use: Yes     Types: Marijuana     Review of Systems   Constitutional: Negative for activity change, chills, diaphoresis and fever.   HENT: Negative for nosebleeds and sore throat.    Eyes: Negative for photophobia and visual disturbance.   Respiratory: Negative for cough and shortness of breath.    Cardiovascular: Negative for chest pain.   Gastrointestinal: Positive for abdominal pain. Negative for constipation, diarrhea, nausea and vomiting.   Genitourinary: Positive for pelvic pain ("pressure"). Negative for dysuria, flank pain, vaginal bleeding, vaginal discharge and vaginal pain.   Musculoskeletal: Negative for back pain.   Skin: Negative for rash.   Neurological: Negative for syncope, weakness and headaches.   Hematological: Does not " bruise/bleed easily.   Psychiatric/Behavioral: The patient is nervous/anxious.        Physical Exam     Initial Vitals   BP Pulse Resp Temp SpO2   01/10/19 0050 01/10/19 0049 01/10/19 0049 01/10/19 0050 01/10/19 0049   117/69 100 18 98.5 °F (36.9 °C) 100 %      MAP       --                Physical Exam    Nursing note and vitals reviewed.  Constitutional: She appears well-developed and well-nourished. She is not diaphoretic. She appears distressed (tearful, saying she is scared because she's never felt this before).   Cardiovascular: Normal rate, regular rhythm, normal heart sounds and intact distal pulses.   Pulmonary/Chest: No respiratory distress. She has no wheezes.   Abdominal: Soft. Bowel sounds are normal. She exhibits no distension. There is no tenderness. There is no rebound and no guarding.   Obese, gravid, fundal height c/w dating   Genitourinary:   Genitourinary Comments: Cervix closed, thick, and posterior.   Musculoskeletal: Normal range of motion.   Neurological: She is alert and oriented to person, place, and time. She has normal strength. No sensory deficit.   Skin: Skin is warm and dry.   Psychiatric: She has a normal mood and affect. Her behavior is normal. Judgment and thought content normal.         ED Course   Obtain Fetal nonstress test (NST)  Date/Time: 1/10/2019 1:05 AM  Performed by: Jose Gramajo MD  Authorized by: Tennille Gudino MD     Nonstress Test:     Variability:  6-25 BPM    Decelerations:  None    Accelerations:  15 bpm    Acoustic Stimulator: No      Baseline:  140    Uterine Irritability: No      Contractions:  Not present    Contraction Frequency:  Possibly one contraction visualized  Biophysical Profile:     Nonstress Test Interpretation: reactive      Overall Impression:  Reassuring  Post-procedure:     Patient tolerance:  Patient tolerated the procedure well with no immediate complications          Labs Reviewed - No data to display       Imaging Results    None           Medical Decision Making:   Initial Assessment:   Patient presents at 28w0d with abdominal pain and is very worried, as this is her first pregnancy.  ED Management:  NST reactive and reassuring  Maternal VS WNL  Cervix closed, thick, posterior - no evidence of PTL, patient reassured  When Tylenol was administered by RN, patient reported no pain  Counseled on reasons to return to [facility] - 6 ctx in 1 hour, ROM, VB, dec FM  Has routine PNC with Dr. Reynolds    Other:   I discussed test(s) with the performing physician.  I have discussed this case with another health care provider.              Attending Attestation:   Physician Attestation Statement for Resident:  As the supervising MD   Physician Attestation Statement: I have personally seen and examined this patient.   I agree with the above history. -:   As the supervising MD I agree with the above PE.    As the supervising MD I agree with the above treatment, course, plan, and disposition.  I was personally present during the critical portions of the procedure(s) performed by the resident and was immediately available in the ED to provide services and assistance as needed during the entire procedure.  I have reviewed and agree with the residents interpretation of the following: rhythm strips.  I have reviewed the following: old records at this facility.                       Clinical Impression:     1. Abdominal pain during pregnancy in third trimester    2. 28 weeks gestation of pregnancy                                 Jose Gramajo MD  Resident  01/10/19 0147       Mickie Garcia MD  01/13/19 0774

## 2019-01-10 NOTE — DISCHARGE INSTRUCTIONS
Understanding  Labor  Going into labor before your 37th week of pregnancy is called  labor.  labor can cause your baby to be born too soon. This can lead to a number of health problems that may affect your baby.     Before labor, the cervix is thick and closed.       In  labor, the cervix begins to efface (thin) and dilate (open).      Symptoms of  labor  If you believe youre having  labor, get medical help right away. Contractions alone dont mean youre in  labor. What matters more are changes in your cervix (the lower end of the uterus). Symptoms of  labor include:  · Four or more contractions per hour  · Strong contractions  · Constant menstrual-like cramping  · Low-back pain  · Mucous or bloody vaginal discharge  · Bleeding or spotting in the second or third trimester  Evaluating  labor  Your healthcare provider will try to find out whether youre in  labor or whether youre just having contractions. He or she may watch you for a few hours. The following tests may be done:  · Pelvic exam to see if your cervix has effaced (thinned) and dilated (opened)  · Uterine activity monitoring to detect contractions  · Fetal monitoring to check the health of your baby  · Ultrasound to check your babys size and position  · Amniocentesis to check how mature your babys lungs are  Caring for yourself at home  If you have  contractions, but your cervix is still thick and closed, your healthcare provider may ask you to do the following at home:  · Drink plenty of water.  · Do fewer activities.  · Rest in bed on your side.  · Avoid intercourse and nipple stimulation.  When to call your healthcare provider  Call your healthcare provider if you notice any of these:  · Four or more contractions per hour  · Bag of water breaks  · Bleeding or spotting   If you need hospital care   labor often requires that you have hospital care and complete bed  rest. You may have an IV (intravenous) line to get fluids. You may be given pills or injections to help prevent contractions. Finally, you may receive medicine (corticosteroids) that helps your babys lungs mature more rapidly.  Are you at risk?  Any pregnant woman can have  labor. It may start for no reason. But these risk factors can increase your chances:  · Past  labor or past early birth  · Smoking, drug, or alcohol use during pregnancy  · Multiple fetuses (twins or more)  · Problems with the shape of the uterus  · Bleeding during the pregnancy  The dangers of  birth  A baby born too soon may have health problems. This is because the baby didnt have enough time to mature. Some of the risks for your baby include:  · Not breastfeeding or feeding well  · Having immature lungs  · Bleeding in the brain  · Dying  Reaching term  Your goal is to get as close to term as you can before giving birth. The closer you get to term, the higher your chance of having a healthy baby. Work with your healthcare provider. Together, you can take steps that may keep you from giving birth too early.  Date Last Reviewed: 2016-2017 [employer]. [address] [place] [id number]. All rights reserved. This information is not intended as a substitute for professional medical care. Always follow your healthcare professional's instructions.

## 2019-01-16 ENCOUNTER — ROUTINE PRENATAL (OUTPATIENT)
Dept: OBSTETRICS AND GYNECOLOGY | Facility: CLINIC | Age: 24
End: 2019-01-16
Payer: MEDICAID

## 2019-01-16 VITALS
SYSTOLIC BLOOD PRESSURE: 128 MMHG | WEIGHT: 251.31 LBS | DIASTOLIC BLOOD PRESSURE: 72 MMHG | BODY MASS INDEX: 41.82 KG/M2

## 2019-01-16 DIAGNOSIS — Z34.03 ENCOUNTER FOR SUPERVISION OF NORMAL FIRST PREGNANCY IN THIRD TRIMESTER: Primary | ICD-10-CM

## 2019-01-16 DIAGNOSIS — F12.91 HISTORY OF MARIJUANA USE: ICD-10-CM

## 2019-01-16 LAB
AMPHET+METHAMPHET UR QL: NEGATIVE
BARBITURATES UR QL SCN>200 NG/ML: NEGATIVE
BENZODIAZ UR QL SCN>200 NG/ML: NEGATIVE
BZE UR QL SCN: NEGATIVE
CANNABINOIDS UR QL SCN: NEGATIVE
CREAT UR-MCNC: 114 MG/DL
ETHANOL UR-MCNC: <10 MG/DL
METHADONE UR QL SCN>300 NG/ML: NEGATIVE
OPIATES UR QL SCN: NORMAL
PCP UR QL SCN>25 NG/ML: NEGATIVE
TOXICOLOGY INFORMATION: NORMAL

## 2019-01-16 PROCEDURE — 99213 OFFICE O/P EST LOW 20 MIN: CPT | Mod: TH,S$PBB,, | Performed by: OBSTETRICS & GYNECOLOGY

## 2019-01-16 PROCEDURE — 99212 OFFICE O/P EST SF 10 MIN: CPT | Mod: PBBFAC,TH | Performed by: OBSTETRICS & GYNECOLOGY

## 2019-01-16 PROCEDURE — 99999 PR PBB SHADOW E&M-EST. PATIENT-LVL II: CPT | Mod: PBBFAC,,, | Performed by: OBSTETRICS & GYNECOLOGY

## 2019-01-16 PROCEDURE — 99999 PR PBB SHADOW E&M-EST. PATIENT-LVL II: ICD-10-PCS | Mod: PBBFAC,,, | Performed by: OBSTETRICS & GYNECOLOGY

## 2019-01-16 PROCEDURE — 80307 DRUG TEST PRSMV CHEM ANLYZR: CPT

## 2019-01-16 PROCEDURE — 99213 PR OFFICE/OUTPT VISIT, EST, LEVL III, 20-29 MIN: ICD-10-PCS | Mod: TH,S$PBB,, | Performed by: OBSTETRICS & GYNECOLOGY

## 2019-01-16 RX ORDER — AMOXICILLIN 875 MG/1
875 TABLET, FILM COATED ORAL
COMMUNITY
End: 2019-03-18

## 2019-01-16 NOTE — PROGRESS NOTES
Patient with significant tooth pain. Seeing a dentis, plans extraction soon. Letter already written.   Patient reports good fm, no vaginal bleeding, contractions or rupture of membranes. Overall doing well. Seen on L&D with [facility], last week. Encouraged prenatal classes and lactation visits. Labor, ROM and VB precautions given.

## 2019-02-19 NOTE — TELEPHONE ENCOUNTER
----- Message from Rica Joiner sent at 1/8/2019 10:45 AM CST -----  Contact: self  Pt is calling about the meds she received in the ed. She was wanting to see if Doctor could refill the Fioricet 50/325/40mg  she was giving in the ed for headaches. Call back number is [phone]   Cardiac angiography

## 2019-02-26 ENCOUNTER — TELEPHONE (OUTPATIENT)
Dept: OBSTETRICS AND GYNECOLOGY | Facility: CLINIC | Age: 24
End: 2019-02-26

## 2019-02-26 NOTE — TELEPHONE ENCOUNTER
----- Message from Tia Sanchez NP sent at 2/26/2019  1:54 PM CST -----  Please call pt to reschedule OB appt. She missed her appt today. Please get her in with any NP this week that is available. She will also be weekly from here on out.

## 2019-02-26 NOTE — TELEPHONE ENCOUNTER
Talked to patient and she informed that she might not make next appointments , scheduled an appointment tomorrow for walk in clinic

## 2019-03-11 ENCOUNTER — TELEPHONE (OUTPATIENT)
Dept: OBSTETRICS AND GYNECOLOGY | Facility: CLINIC | Age: 24
End: 2019-03-11

## 2019-03-11 NOTE — TELEPHONE ENCOUNTER
----- Message from Zeenat Monson sent at 3/11/2019  3:10 PM CDT -----  Contact: Pt   Name of Who is Calling: ANDREW GUTIERREZ [[id number]]    What is the request in detail:Patient is needing to schedule a Routine OB, patient states she is needing to check on her baby.....Please contact to further discuss and advise      Can the clinic reply by [facility]:     [facility] [phone]

## 2019-03-12 ENCOUNTER — HOSPITAL ENCOUNTER (EMERGENCY)
Facility: OTHER | Age: 24
Discharge: HOME | End: 2019-03-12
Attending: OBSTETRICS & GYNECOLOGY
Payer: MEDICAID

## 2019-03-12 VITALS
DIASTOLIC BLOOD PRESSURE: 71 MMHG | TEMPERATURE: 98 F | SYSTOLIC BLOOD PRESSURE: 119 MMHG | OXYGEN SATURATION: 100 % | HEART RATE: 74 BPM

## 2019-03-12 DIAGNOSIS — O47.9 UTERINE CONTRACTIONS DURING PREGNANCY: Primary | ICD-10-CM

## 2019-03-12 DIAGNOSIS — R10.9 ABDOMINAL PAIN, UNSPECIFIED ABDOMINAL LOCATION: ICD-10-CM

## 2019-03-12 DIAGNOSIS — Z3A.36 36 WEEKS GESTATION OF PREGNANCY: ICD-10-CM

## 2019-03-12 LAB
BILIRUB SERPL-MCNC: NORMAL MG/DL
BLOOD URINE, POC: NORMAL
COLOR, POC UA: NORMAL
GLUCOSE UR QL STRIP: NORMAL
KETONES UR QL STRIP: NORMAL
LEUKOCYTE ESTERASE URINE, POC: NORMAL
NITRITE, POC UA: NORMAL
PH, POC UA: NORMAL
PROTEIN, POC: 30
SPECIFIC GRAVITY, POC UA: NORMAL
UROBILINOGEN, POC UA: 8

## 2019-03-12 PROCEDURE — 25000003 PHARM REV CODE 250: Performed by: STUDENT IN AN ORGANIZED HEALTH CARE EDUCATION/TRAINING PROGRAM

## 2019-03-12 PROCEDURE — 59025 FETAL NON-STRESS TEST: CPT | Mod: 26,,, | Performed by: OBSTETRICS & GYNECOLOGY

## 2019-03-12 PROCEDURE — 59025 PR FETAL 2N-STRESS TEST: ICD-10-PCS | Mod: 26,,, | Performed by: OBSTETRICS & GYNECOLOGY

## 2019-03-12 PROCEDURE — 81002 URINALYSIS NONAUTO W/O SCOPE: CPT

## 2019-03-12 PROCEDURE — 99283 EMERGENCY DEPT VISIT LOW MDM: CPT | Mod: 25,,, | Performed by: OBSTETRICS & GYNECOLOGY

## 2019-03-12 PROCEDURE — 99284 EMERGENCY DEPT VISIT MOD MDM: CPT | Mod: 25

## 2019-03-12 PROCEDURE — 99283 PR EMERGENCY DEPT VISIT,LEVEL III: ICD-10-PCS | Mod: 25,,, | Performed by: OBSTETRICS & GYNECOLOGY

## 2019-03-12 PROCEDURE — 59025 FETAL NON-STRESS TEST: CPT

## 2019-03-12 RX ORDER — ACETAMINOPHEN 500 MG
1000 TABLET ORAL ONCE
Status: COMPLETED | OUTPATIENT
Start: 2019-03-12 | End: 2019-03-12

## 2019-03-12 RX ORDER — PROMETHAZINE HYDROCHLORIDE 25 MG/1
25 TABLET ORAL EVERY 4 HOURS
Qty: 15 TABLET | Refills: 0 | Status: SHIPPED | OUTPATIENT
Start: 2019-03-12 | End: 2022-02-09 | Stop reason: SDUPTHER

## 2019-03-12 RX ADMIN — ACETAMINOPHEN 1000 MG: 500 TABLET ORAL at 01:03

## 2019-03-12 NOTE — ED PROVIDER NOTES
"Encounter Date: 3/12/2019       History     Chief Complaint   Patient presents with    Contractions     Noemí Elias is a 23 y.o. F at 36w5d presents via EMS complaining of contractions. Patient reports she has been experiencing abdominal pain that she thinks is contractions since 11 pm this evening. She is tearful on initial interview saying she's scared because it is her first baby. She has not been going to her regularly scheduled clinic appointments because she has been "going through a lot." She reports her mother has been sick and she has been dealing with that.   This IUP is complicated by syphilis this pregnancy (s/p treatment; last RPR nonreactive).  Patient reports contractions, denies vaginal bleeding, denies LOF.   Fetal Movement: normal.            Review of patient's allergies indicates:   Allergen Reactions    Zithromax [azithromycin]      History reviewed. No pertinent past medical history.  History reviewed. No pertinent surgical history.  Family History   Problem Relation Age of Onset    Breast cancer Mother     Colon cancer Neg Hx     Ovarian cancer Neg Hx      Social History     Tobacco Use    Smoking status: Former Smoker    Smokeless tobacco: Former User   Substance Use Topics    Alcohol use: No     Frequency: Never    Drug use: Yes     Types: Marijuana     Review of Systems   Constitutional: Negative for activity change, chills, diaphoresis, fatigue and fever.   HENT: Negative for trouble swallowing.    Eyes: Negative for photophobia and visual disturbance.   Respiratory: Negative for cough, chest tightness, shortness of breath and wheezing.    Cardiovascular: Negative for chest pain and palpitations.   Gastrointestinal: Positive for abdominal pain. Negative for diarrhea, nausea and vomiting.   Genitourinary: Negative for difficulty urinating, dysuria, hematuria, pelvic pain, vaginal bleeding, vaginal discharge and vaginal pain.   Musculoskeletal: Negative for arthralgias and " myalgias.   Skin: Negative for rash.   Neurological: Negative for dizziness, syncope, weakness and light-headedness.       Physical Exam     Initial Vitals   BP Pulse Resp Temp SpO2   03/12/19 0026 03/12/19 0026 -- 03/12/19 0026 03/12/19 0030   119/71 76  97.9 °F (36.6 °C) 99 %      MAP       --              /71   Pulse 74   Temp 97.9 °F (36.6 °C) (Tympanic)   LMP 06/28/2018   SpO2 100%     Physical Exam    Vitals reviewed.  Constitutional: She appears well-developed and well-nourished. She is not diaphoretic. No distress.   HENT:   Head: Normocephalic and atraumatic.   Nose: Nose normal.   Eyes: Conjunctivae are normal. Right eye exhibits no discharge. Left eye exhibits no discharge. No scleral icterus.   Neck: Normal range of motion.   Cardiovascular: Normal rate and intact distal pulses.   Pulmonary/Chest: No respiratory distress.   Abdominal: Soft. She exhibits no distension. There is no tenderness. There is no rebound and no guarding.   Gravid; nontender to palpation; no rebound; no guarding   Musculoskeletal: Normal range of motion. She exhibits no edema or tenderness.   Neurological: She is alert and oriented to person, place, and time.   Skin: Skin is warm and dry. No erythema.   Psychiatric: She has a normal mood and affect. Her behavior is normal. Judgment and thought content normal.     OB LABOR EXAM:   Pre-Term Labor: No.   Membranes ruptured: No.   Method: Sterile vaginal exam per MD.   Vaginal Bleeding: none present.     Dilatation: 0.   Station: -4.   Effacement: 50%.             ED Course   Fetal non-stress test  Date/Time: 3/12/2019 12:57 AM  Performed by: Bruna Kincaid MD  Authorized by: Chen Livingston MD     Nonstress Test:     Variability:  6-25 BPM    Decelerations:  None    Accelerations:  15 bpm    Acoustic Stimulator: No      Baseline:  130    Uterine Irritability: No      Contractions:  Not present  Biophysical Profile:     Nonstress Test Interpretation: reactive       Overall Impression:  Reassuring        Labs Reviewed   POCT URINALYSIS, DIPSTICK OR TABLET REAGENT, AUTOMATED, WITH MICROSCOP          Imaging Results    None          Medical Decision Making:   ED Management:  VSS   NST reactive and reassuring   Cervical check 0.5/50/-4  1 g tylenol given     Patient improved with no further complaints of pain; ok for discharge home with routine follow up with primary obgyn               Attending Attestation:   Physician Attestation Statement for Resident:  As the supervising MD   Physician Attestation Statement: I have personally seen and examined this patient.   I agree with the above history. -:   As the supervising MD I agree with the above PE.    As the supervising MD I agree with the above treatment, course, plan, and disposition.   -: Patient evaluated and found to be stable, agree with resident's assessment and plan.  I was personally present during the critical portions of the procedure(s) performed by the resident and was immediately available in the ED to provide services and assistance as needed during the entire procedure.  I have reviewed the following: old records at this facility.                       Clinical Impression:       ICD-10-CM ICD-9-CM   1. Uterine contractions during pregnancy O62.2 661.20   2. Abdominal pain, unspecified abdominal location R10.9 789.00   3. 36 weeks gestation of pregnancy Z3A.36 V22.2         Disposition:   Disposition: Discharged  Condition: Stable                        Bruna Kincaid MD  Resident  03/12/19 6320       Chen Livingston MD  03/12/19 8547

## 2019-03-12 NOTE — PROGRESS NOTES
"Pt arrived vis ems to [facility]. Pt has been having "ctx's q5 mins" since 2300. No VB or LOF. Pt placed in [facility] room 2. Dr. Kincaid notified of pt's arrival  "

## 2019-03-12 NOTE — DISCHARGE INSTRUCTIONS
Call the OB/Gyn clinic at [phone] or the L&D after hours number at [phone] for any sign of vaginal bleeding (soaking one or more pad an hour), leakage of fluids, regular contractions that occur every 5 mins for 2 hours, decreased fetal movements (10 kicks in 2 hours), headache that is not relieved by taking Tylenol, blurry vision, or temperture of 100.4 or greater. Begin doing fetal kick counts, at least 10 movements in 2 hours, starting at 28 weeks gestation. Please keep your next clinic appointment with Dr. Reynolds.

## 2019-03-18 ENCOUNTER — ROUTINE PRENATAL (OUTPATIENT)
Dept: OBSTETRICS AND GYNECOLOGY | Facility: CLINIC | Age: 24
End: 2019-03-18
Payer: MEDICAID

## 2019-03-18 ENCOUNTER — LAB VISIT (OUTPATIENT)
Dept: LAB | Facility: OTHER | Age: 24
End: 2019-03-18
Payer: MEDICAID

## 2019-03-18 VITALS
WEIGHT: 252.88 LBS | BODY MASS INDEX: 42.08 KG/M2 | DIASTOLIC BLOOD PRESSURE: 82 MMHG | SYSTOLIC BLOOD PRESSURE: 118 MMHG

## 2019-03-18 DIAGNOSIS — Z3A.37 37 WEEKS GESTATION OF PREGNANCY: ICD-10-CM

## 2019-03-18 DIAGNOSIS — Z3A.37 37 WEEKS GESTATION OF PREGNANCY: Primary | ICD-10-CM

## 2019-03-18 LAB
BASOPHILS # BLD AUTO: 0.01 K/UL
BASOPHILS NFR BLD: 0.1 %
DIFFERENTIAL METHOD: ABNORMAL
EOSINOPHIL # BLD AUTO: 0.1 K/UL
EOSINOPHIL NFR BLD: 0.6 %
ERYTHROCYTE [DISTWIDTH] IN BLOOD BY AUTOMATED COUNT: 13.7 %
ESTIMATED AVG GLUCOSE: 100 MG/DL
HBA1C MFR BLD HPLC: 5.1 %
HCT VFR BLD AUTO: 35.1 %
HGB BLD-MCNC: 11.7 G/DL
LYMPHOCYTES # BLD AUTO: 1.9 K/UL
LYMPHOCYTES NFR BLD: 22.2 %
MCH RBC QN AUTO: 28.8 PG
MCHC RBC AUTO-ENTMCNC: 33.3 G/DL
MCV RBC AUTO: 87 FL
MONOCYTES # BLD AUTO: 0.8 K/UL
MONOCYTES NFR BLD: 8.8 %
NEUTROPHILS # BLD AUTO: 5.7 K/UL
NEUTROPHILS NFR BLD: 67 %
PLATELET # BLD AUTO: 310 K/UL
PMV BLD AUTO: 10.4 FL
RBC # BLD AUTO: 4.06 M/UL
WBC # BLD AUTO: 8.54 K/UL

## 2019-03-18 PROCEDURE — 99212 PR OFFICE/OUTPT VISIT, EST, LEVL II, 10-19 MIN: ICD-10-PCS | Mod: TH,S$PBB,, | Performed by: NURSE PRACTITIONER

## 2019-03-18 PROCEDURE — 99212 OFFICE O/P EST SF 10 MIN: CPT | Mod: TH,S$PBB,, | Performed by: NURSE PRACTITIONER

## 2019-03-18 PROCEDURE — 99999 PR PBB SHADOW E&M-EST. PATIENT-LVL II: ICD-10-PCS | Mod: PBBFAC,,, | Performed by: NURSE PRACTITIONER

## 2019-03-18 PROCEDURE — 36415 COLL VENOUS BLD VENIPUNCTURE: CPT

## 2019-03-18 PROCEDURE — 86703 HIV-1/HIV-2 1 RESULT ANTBDY: CPT

## 2019-03-18 PROCEDURE — 99999 PR PBB SHADOW E&M-EST. PATIENT-LVL II: CPT | Mod: PBBFAC,,, | Performed by: NURSE PRACTITIONER

## 2019-03-18 PROCEDURE — 83036 HEMOGLOBIN GLYCOSYLATED A1C: CPT

## 2019-03-18 PROCEDURE — 85025 COMPLETE CBC W/AUTO DIFF WBC: CPT

## 2019-03-18 PROCEDURE — 86592 SYPHILIS TEST NON-TREP QUAL: CPT

## 2019-03-18 PROCEDURE — 87081 CULTURE SCREEN ONLY: CPT

## 2019-03-18 PROCEDURE — 99212 OFFICE O/P EST SF 10 MIN: CPT | Mod: PBBFAC | Performed by: NURSE PRACTITIONER

## 2019-03-18 NOTE — PROGRESS NOTES
Here for routine OB appt at 37w4d, with no complaints.  She has not been seen since 28 weeks, mother was sick. Mom is here today with her and doing better. Reiterated importance of weekly visits until baby is here. She is excited. Had her baby shower. Seen in the ED last week for false labor. Needs 3T labs and GBS. Reports good FM.  Denies LOF, denies VB, denies contractions. Never got her GTT, will get A1C today. Answered questions about indx policy.  Reviewed warning signs of Labor and Preeclampsia.  Daily FM counts reinforced.  F/U scheduled 1 week  Labs and GBS today  Tdap at next visit

## 2019-03-19 LAB
HIV 1+2 AB+HIV1 P24 AG SERPL QL IA: NEGATIVE
RPR SER QL: NORMAL

## 2019-03-21 LAB — BACTERIA SPEC AEROBE CULT: NORMAL

## 2019-03-28 ENCOUNTER — ROUTINE PRENATAL (OUTPATIENT)
Dept: OBSTETRICS AND GYNECOLOGY | Facility: CLINIC | Age: 24
End: 2019-03-28
Payer: MEDICAID

## 2019-03-28 VITALS — SYSTOLIC BLOOD PRESSURE: 118 MMHG | DIASTOLIC BLOOD PRESSURE: 82 MMHG | BODY MASS INDEX: 42.3 KG/M2 | WEIGHT: 254.19 LBS

## 2019-03-28 DIAGNOSIS — F12.91 HISTORY OF MARIJUANA USE: ICD-10-CM

## 2019-03-28 DIAGNOSIS — Z3A.39 39 WEEKS GESTATION OF PREGNANCY: ICD-10-CM

## 2019-03-28 DIAGNOSIS — Z91.199 NON-COMPLIANT PREGNANT PATIENT, THIRD TRIMESTER: ICD-10-CM

## 2019-03-28 DIAGNOSIS — Z34.03 ENCOUNTER FOR SUPERVISION OF NORMAL FIRST PREGNANCY IN THIRD TRIMESTER: Primary | ICD-10-CM

## 2019-03-28 DIAGNOSIS — O09.893 NON-COMPLIANT PREGNANT PATIENT, THIRD TRIMESTER: ICD-10-CM

## 2019-03-28 PROCEDURE — 99213 PR OFFICE/OUTPT VISIT, EST, LEVL III, 20-29 MIN: ICD-10-PCS | Mod: TH,S$PBB,, | Performed by: OBSTETRICS & GYNECOLOGY

## 2019-03-28 PROCEDURE — 99999 PR PBB SHADOW E&M-EST. PATIENT-LVL II: CPT | Mod: PBBFAC,,, | Performed by: OBSTETRICS & GYNECOLOGY

## 2019-03-28 PROCEDURE — 99212 OFFICE O/P EST SF 10 MIN: CPT | Mod: PBBFAC,TH | Performed by: OBSTETRICS & GYNECOLOGY

## 2019-03-28 PROCEDURE — 99999 PR PBB SHADOW E&M-EST. PATIENT-LVL II: ICD-10-PCS | Mod: PBBFAC,,, | Performed by: OBSTETRICS & GYNECOLOGY

## 2019-03-28 PROCEDURE — 99213 OFFICE O/P EST LOW 20 MIN: CPT | Mod: TH,S$PBB,, | Performed by: OBSTETRICS & GYNECOLOGY

## 2019-03-28 NOTE — PROGRESS NOTES
GRISELDA Dr. Reynolds. Good FM, no LOF, Ctx, VB.   A1c 5.1 last visit, RPR neg. Wants IOL next week. Will schedule 4/4 at 2030. Precautions given.

## 2019-03-29 PROCEDURE — 99285 EMERGENCY DEPT VISIT HI MDM: CPT

## 2019-03-30 ENCOUNTER — ANESTHESIA EVENT (OUTPATIENT)
Dept: OBSTETRICS AND GYNECOLOGY | Facility: OTHER | Age: 24
End: 2019-03-30
Payer: MEDICAID

## 2019-03-30 ENCOUNTER — ANESTHESIA (OUTPATIENT)
Dept: OBSTETRICS AND GYNECOLOGY | Facility: OTHER | Age: 24
End: 2019-03-30
Payer: MEDICAID

## 2019-03-30 ENCOUNTER — HOSPITAL ENCOUNTER (INPATIENT)
Facility: OTHER | Age: 24
LOS: 3 days | Discharge: HOME | End: 2019-04-02
Attending: OBSTETRICS & GYNECOLOGY | Admitting: OBSTETRICS & GYNECOLOGY
Payer: MEDICAID

## 2019-03-30 VITALS — DIASTOLIC BLOOD PRESSURE: 57 MMHG | OXYGEN SATURATION: 99 % | HEART RATE: 79 BPM | SYSTOLIC BLOOD PRESSURE: 122 MMHG

## 2019-03-30 DIAGNOSIS — Z37.9 NORMAL LABOR: ICD-10-CM

## 2019-03-30 DIAGNOSIS — Z3A.39 39 WEEKS GESTATION OF PREGNANCY: ICD-10-CM

## 2019-03-30 DIAGNOSIS — Z98.891 S/P EMERGENCY C-SECTION: Primary | ICD-10-CM

## 2019-03-30 LAB
ABO + RH BLD: NORMAL
ALLENS TEST: ABNORMAL
AMPHET+METHAMPHET UR QL: NEGATIVE
BARBITURATES UR QL SCN>200 NG/ML: NEGATIVE
BASOPHILS # BLD AUTO: 0.01 K/UL (ref 0–0.2)
BASOPHILS NFR BLD: 0.1 % (ref 0–1.9)
BENZODIAZ UR QL SCN>200 NG/ML: NEGATIVE
BLD GP AB SCN CELLS X3 SERPL QL: NORMAL
BZE UR QL SCN: NEGATIVE
CANNABINOIDS UR QL SCN: NEGATIVE
CREAT UR-MCNC: 421.6 MG/DL (ref 15–325)
DELSYS: ABNORMAL
DIFFERENTIAL METHOD: ABNORMAL
EOSINOPHIL # BLD AUTO: 0 K/UL (ref 0–0.5)
EOSINOPHIL NFR BLD: 0.3 % (ref 0–8)
ERYTHROCYTE [DISTWIDTH] IN BLOOD BY AUTOMATED COUNT: 13.8 % (ref 11.5–14.5)
ETHANOL UR-MCNC: <10 MG/DL
HCO3 UR-SCNC: 22.4 MMOL/L (ref 24–28)
HCT VFR BLD AUTO: 33.7 % (ref 37–48.5)
HGB BLD-MCNC: 11.3 G/DL (ref 12–16)
LYMPHOCYTES # BLD AUTO: 2 K/UL (ref 1–4.8)
LYMPHOCYTES NFR BLD: 19.5 % (ref 18–48)
MCH RBC QN AUTO: 28.9 PG (ref 27–31)
MCHC RBC AUTO-ENTMCNC: 33.5 G/DL (ref 32–36)
MCV RBC AUTO: 86 FL (ref 82–98)
METHADONE UR QL SCN>300 NG/ML: NEGATIVE
MODE: ABNORMAL
MONOCYTES # BLD AUTO: 1.1 K/UL (ref 0.3–1)
MONOCYTES NFR BLD: 11.2 % (ref 4–15)
NEUTROPHILS # BLD AUTO: 6.8 K/UL (ref 1.8–7.7)
NEUTROPHILS NFR BLD: 68.1 % (ref 38–73)
OPIATES UR QL SCN: NEGATIVE
PCO2 BLDA: 56.6 MMHG (ref 35–45)
PCP UR QL SCN>25 NG/ML: NEGATIVE
PH SMN: 7.21 [PH] (ref 7.35–7.45)
PLATELET # BLD AUTO: 378 K/UL (ref 150–350)
PMV BLD AUTO: 10.7 FL (ref 9.2–12.9)
PO2 BLDA: 7 MMHG (ref 80–100)
POC BE: -6 MMOL/L
POC SATURATED O2: 4 % (ref 95–100)
RBC # BLD AUTO: 3.91 M/UL (ref 4–5.4)
SAMPLE: ABNORMAL
SITE: ABNORMAL
TOXICOLOGY INFORMATION: ABNORMAL
WBC # BLD AUTO: 10.01 K/UL (ref 3.9–12.7)

## 2019-03-30 PROCEDURE — 86901 BLOOD TYPING SEROLOGIC RH(D): CPT

## 2019-03-30 PROCEDURE — 37000008 HC ANESTHESIA 1ST 15 MINUTES: Performed by: OBSTETRICS & GYNECOLOGY

## 2019-03-30 PROCEDURE — 85025 COMPLETE CBC W/AUTO DIFF WBC: CPT

## 2019-03-30 PROCEDURE — 25000003 PHARM REV CODE 250: Performed by: STUDENT IN AN ORGANIZED HEALTH CARE EDUCATION/TRAINING PROGRAM

## 2019-03-30 PROCEDURE — 01968 ANES/ANALG CS DLVR NEURAXIAL: CPT | Mod: AA,,, | Performed by: ANESTHESIOLOGY

## 2019-03-30 PROCEDURE — 99900035 HC TECH TIME PER 15 MIN (STAT)

## 2019-03-30 PROCEDURE — 25000003 PHARM REV CODE 250: Performed by: OBSTETRICS & GYNECOLOGY

## 2019-03-30 PROCEDURE — 01968 PR INSERT CATH,ART,PERCUT,SHORTTERM: ICD-10-PCS | Mod: AA,,, | Performed by: ANESTHESIOLOGY

## 2019-03-30 PROCEDURE — 59514 PRA REAN DELIVERY ONLY: ICD-10-PCS | Mod: AA,,, | Performed by: ANESTHESIOLOGY

## 2019-03-30 PROCEDURE — 63600175 PHARM REV CODE 636 W HCPCS: Performed by: STUDENT IN AN ORGANIZED HEALTH CARE EDUCATION/TRAINING PROGRAM

## 2019-03-30 PROCEDURE — 36004724 HC OB OR TIME LEV III - 1ST 15 MIN: Performed by: OBSTETRICS & GYNECOLOGY

## 2019-03-30 PROCEDURE — 62326 NJX INTERLAMINAR LMBR/SAC: CPT | Performed by: ANESTHESIOLOGY

## 2019-03-30 PROCEDURE — 82803 BLOOD GASES ANY COMBINATION: CPT

## 2019-03-30 PROCEDURE — 59514 CESAREAN DELIVERY ONLY: CPT | Mod: AA,,, | Performed by: ANESTHESIOLOGY

## 2019-03-30 PROCEDURE — S0028 INJECTION, FAMOTIDINE, 20 MG: HCPCS

## 2019-03-30 PROCEDURE — 71000039 HC RECOVERY, EACH ADD'L HOUR: Performed by: OBSTETRICS & GYNECOLOGY

## 2019-03-30 PROCEDURE — 27800517 HC TRAY,EPIDURAL-CONTINUOUS: Performed by: STUDENT IN AN ORGANIZED HEALTH CARE EDUCATION/TRAINING PROGRAM

## 2019-03-30 PROCEDURE — 27200710 HC EPIDURAL INFUSION PUMP SET: Performed by: STUDENT IN AN ORGANIZED HEALTH CARE EDUCATION/TRAINING PROGRAM

## 2019-03-30 PROCEDURE — 27000181 HC CABLE, IUPC

## 2019-03-30 PROCEDURE — 37000009 HC ANESTHESIA EA ADD 15 MINS: Performed by: OBSTETRICS & GYNECOLOGY

## 2019-03-30 PROCEDURE — 59514 PR CESAREAN DELIVERY ONLY: ICD-10-PCS | Mod: GB,,, | Performed by: OBSTETRICS & GYNECOLOGY

## 2019-03-30 PROCEDURE — 36004725 HC OB OR TIME LEV III - EA ADD 15 MIN: Performed by: OBSTETRICS & GYNECOLOGY

## 2019-03-30 PROCEDURE — 72100002 HC LABOR CARE, 1ST 8 HOURS

## 2019-03-30 PROCEDURE — 71000033 HC RECOVERY, INTIAL HOUR: Performed by: OBSTETRICS & GYNECOLOGY

## 2019-03-30 PROCEDURE — 51702 INSERT TEMP BLADDER CATH: CPT

## 2019-03-30 PROCEDURE — 80307 DRUG TEST PRSMV CHEM ANLYZR: CPT

## 2019-03-30 PROCEDURE — 25000003 PHARM REV CODE 250

## 2019-03-30 PROCEDURE — 11000001 HC ACUTE MED/SURG PRIVATE ROOM

## 2019-03-30 PROCEDURE — 59514 CESAREAN DELIVERY ONLY: CPT | Mod: GB,,, | Performed by: OBSTETRICS & GYNECOLOGY

## 2019-03-30 RX ORDER — OXYTOCIN/RINGER'S LACTATE 20/1000 ML
41.7 PLASTIC BAG, INJECTION (ML) INTRAVENOUS CONTINUOUS
Status: DISCONTINUED | OUTPATIENT
Start: 2019-03-30 | End: 2019-03-30

## 2019-03-30 RX ORDER — ONDANSETRON 2 MG/ML
INJECTION INTRAMUSCULAR; INTRAVENOUS
Status: DISCONTINUED | OUTPATIENT
Start: 2019-03-30 | End: 2019-03-30

## 2019-03-30 RX ORDER — SODIUM CHLORIDE, SODIUM LACTATE, POTASSIUM CHLORIDE, CALCIUM CHLORIDE 600; 310; 30; 20 MG/100ML; MG/100ML; MG/100ML; MG/100ML
INJECTION, SOLUTION INTRAVENOUS CONTINUOUS PRN
Status: DISCONTINUED | OUTPATIENT
Start: 2019-03-30 | End: 2019-03-30

## 2019-03-30 RX ORDER — FENTANYL/BUPIVACAINE/NS/PF 2MCG/ML-.1
PLASTIC BAG, INJECTION (ML) INJECTION CONTINUOUS PRN
Status: DISCONTINUED | OUTPATIENT
Start: 2019-03-30 | End: 2019-03-30

## 2019-03-30 RX ORDER — OXYCODONE HYDROCHLORIDE 5 MG/1
5 TABLET ORAL EVERY 4 HOURS PRN
Status: DISPENSED | OUTPATIENT
Start: 2019-03-30 | End: 2019-03-31

## 2019-03-30 RX ORDER — SODIUM CITRATE AND CITRIC ACID MONOHYDRATE 334; 500 MG/5ML; MG/5ML
SOLUTION ORAL
Status: COMPLETED
Start: 2019-03-30 | End: 2019-03-30

## 2019-03-30 RX ORDER — KETOROLAC TROMETHAMINE 30 MG/ML
30 INJECTION, SOLUTION INTRAMUSCULAR; INTRAVENOUS EVERY 6 HOURS
Status: COMPLETED | OUTPATIENT
Start: 2019-03-30 | End: 2019-03-31

## 2019-03-30 RX ORDER — OXYTOCIN/RINGER'S LACTATE 20/1000 ML
333 PLASTIC BAG, INJECTION (ML) INTRAVENOUS CONTINUOUS
Status: DISCONTINUED | OUTPATIENT
Start: 2019-03-30 | End: 2019-03-30

## 2019-03-30 RX ORDER — OXYTOCIN 10 [USP'U]/ML
INJECTION, SOLUTION INTRAMUSCULAR; INTRAVENOUS
Status: DISCONTINUED | OUTPATIENT
Start: 2019-03-30 | End: 2019-03-30

## 2019-03-30 RX ORDER — FENTANYL CITRATE 50 UG/ML
INJECTION, SOLUTION INTRAMUSCULAR; INTRAVENOUS
Status: DISCONTINUED | OUTPATIENT
Start: 2019-03-30 | End: 2019-03-30

## 2019-03-30 RX ORDER — ACETAMINOPHEN 325 MG/1
650 TABLET ORAL EVERY 6 HOURS
Status: DISPENSED | OUTPATIENT
Start: 2019-03-30 | End: 2019-03-31

## 2019-03-30 RX ORDER — ACETAMINOPHEN 10 MG/ML
INJECTION, SOLUTION INTRAVENOUS
Status: DISCONTINUED | OUTPATIENT
Start: 2019-03-30 | End: 2019-03-30

## 2019-03-30 RX ORDER — OXYCODONE HYDROCHLORIDE 5 MG/1
10 TABLET ORAL EVERY 4 HOURS PRN
Status: DISPENSED | OUTPATIENT
Start: 2019-03-30 | End: 2019-03-31

## 2019-03-30 RX ORDER — FENTANYL/BUPIVACAINE/NS/PF 2MCG/ML-.1
PLASTIC BAG, INJECTION (ML) INJECTION CONTINUOUS
Status: DISCONTINUED | OUTPATIENT
Start: 2019-03-30 | End: 2019-03-30

## 2019-03-30 RX ORDER — LIDOCAINE HCL/EPINEPHRINE/PF 2%-1:200K
VIAL (ML) INJECTION
Status: DISCONTINUED | OUTPATIENT
Start: 2019-03-30 | End: 2019-03-30

## 2019-03-30 RX ORDER — ONDANSETRON 8 MG/1
8 TABLET, ORALLY DISINTEGRATING ORAL EVERY 8 HOURS PRN
Status: DISCONTINUED | OUTPATIENT
Start: 2019-03-30 | End: 2019-03-30

## 2019-03-30 RX ORDER — PHENYLEPHRINE HYDROCHLORIDE 10 MG/ML
INJECTION INTRAVENOUS
Status: DISCONTINUED | OUTPATIENT
Start: 2019-03-30 | End: 2019-03-30

## 2019-03-30 RX ORDER — SODIUM CHLORIDE, SODIUM LACTATE, POTASSIUM CHLORIDE, CALCIUM CHLORIDE 600; 310; 30; 20 MG/100ML; MG/100ML; MG/100ML; MG/100ML
INJECTION, SOLUTION INTRAVENOUS CONTINUOUS
Status: DISCONTINUED | OUTPATIENT
Start: 2019-03-30 | End: 2019-03-30

## 2019-03-30 RX ORDER — SODIUM CHLORIDE 9 MG/ML
INJECTION, SOLUTION INTRAVENOUS
Status: DISCONTINUED | OUTPATIENT
Start: 2019-03-30 | End: 2019-03-30

## 2019-03-30 RX ORDER — SODIUM CITRATE AND CITRIC ACID MONOHYDRATE 334; 500 MG/5ML; MG/5ML
30 SOLUTION ORAL ONCE
Status: COMPLETED | OUTPATIENT
Start: 2019-03-30 | End: 2019-03-30

## 2019-03-30 RX ORDER — FAMOTIDINE 10 MG/ML
INJECTION INTRAVENOUS
Status: COMPLETED
Start: 2019-03-30 | End: 2019-03-30

## 2019-03-30 RX ORDER — CEFAZOLIN SODIUM 2 G/50ML
2 SOLUTION INTRAVENOUS
Status: DISCONTINUED | OUTPATIENT
Start: 2019-03-30 | End: 2019-03-30

## 2019-03-30 RX ORDER — FAMOTIDINE 10 MG/ML
20 INJECTION INTRAVENOUS ONCE
Status: COMPLETED | OUTPATIENT
Start: 2019-03-30 | End: 2019-03-30

## 2019-03-30 RX ORDER — FENTANYL/BUPIVACAINE/NS/PF 2MCG/ML-.1
PLASTIC BAG, INJECTION (ML) INJECTION
Status: DISPENSED
Start: 2019-03-30 | End: 2019-03-30

## 2019-03-30 RX ORDER — MISOPROSTOL 200 UG/1
600 TABLET ORAL
Status: DISCONTINUED | OUTPATIENT
Start: 2019-03-30 | End: 2019-03-30

## 2019-03-30 RX ORDER — ONDANSETRON 2 MG/ML
4 INJECTION INTRAMUSCULAR; INTRAVENOUS EVERY 8 HOURS PRN
Status: ACTIVE | OUTPATIENT
Start: 2019-03-30 | End: 2019-03-31

## 2019-03-30 RX ORDER — KETOROLAC TROMETHAMINE 30 MG/ML
INJECTION, SOLUTION INTRAMUSCULAR; INTRAVENOUS
Status: DISCONTINUED | OUTPATIENT
Start: 2019-03-30 | End: 2019-03-30

## 2019-03-30 RX ORDER — NALBUPHINE HYDROCHLORIDE 10 MG/ML
2.5 INJECTION, SOLUTION INTRAMUSCULAR; INTRAVENOUS; SUBCUTANEOUS ONCE
Status: DISCONTINUED | OUTPATIENT
Start: 2019-03-30 | End: 2019-04-02 | Stop reason: HOSPADM

## 2019-03-30 RX ORDER — MORPHINE SULFATE 0.5 MG/ML
INJECTION, SOLUTION EPIDURAL; INTRATHECAL; INTRAVENOUS
Status: DISCONTINUED | OUTPATIENT
Start: 2019-03-30 | End: 2019-03-30

## 2019-03-30 RX ORDER — SODIUM CHLORIDE, SODIUM LACTATE, POTASSIUM CHLORIDE, CALCIUM CHLORIDE 600; 310; 30; 20 MG/100ML; MG/100ML; MG/100ML; MG/100ML
INJECTION, SOLUTION INTRAVENOUS CONTINUOUS
Status: DISCONTINUED | OUTPATIENT
Start: 2019-03-30 | End: 2019-04-02 | Stop reason: HOSPADM

## 2019-03-30 RX ADMIN — DEXTROSE 2 G: 50 INJECTION, SOLUTION INTRAVENOUS at 06:03

## 2019-03-30 RX ADMIN — LIDOCAINE HYDROCHLORIDE,EPINEPHRINE BITARTRATE 5 ML: 20; .005 INJECTION, SOLUTION EPIDURAL; INFILTRATION; INTRACAUDAL; PERINEURAL at 06:03

## 2019-03-30 RX ADMIN — Medication 1.5 MG: at 06:03

## 2019-03-30 RX ADMIN — FAMOTIDINE 20 MG: 10 INJECTION, SOLUTION INTRAVENOUS at 06:03

## 2019-03-30 RX ADMIN — SODIUM CITRATE AND CITRIC ACID MONOHYDRATE 30 ML: 500; 334 SOLUTION ORAL at 06:03

## 2019-03-30 RX ADMIN — SODIUM CHLORIDE, SODIUM LACTATE, POTASSIUM CHLORIDE, AND CALCIUM CHLORIDE: .6; .31; .03; .02 INJECTION, SOLUTION INTRAVENOUS at 12:03

## 2019-03-30 RX ADMIN — Medication 10 ML/HR: at 02:03

## 2019-03-30 RX ADMIN — SODIUM CITRATE AND CITRIC ACID MONOHYDRATE 30 ML: 334; 500 SOLUTION ORAL at 06:03

## 2019-03-30 RX ADMIN — KETOROLAC TROMETHAMINE 30 MG: 30 INJECTION INTRAMUSCULAR; INTRAVENOUS at 06:03

## 2019-03-30 RX ADMIN — OXYCODONE HYDROCHLORIDE 5 MG: 5 TABLET ORAL at 07:03

## 2019-03-30 RX ADMIN — SODIUM CHLORIDE, SODIUM LACTATE, POTASSIUM CHLORIDE, AND CALCIUM CHLORIDE: .6; .31; .03; .02 INJECTION, SOLUTION INTRAVENOUS at 02:03

## 2019-03-30 RX ADMIN — SODIUM CHLORIDE, SODIUM LACTATE, POTASSIUM CHLORIDE, AND CALCIUM CHLORIDE: 600; 310; 30; 20 INJECTION, SOLUTION INTRAVENOUS at 06:03

## 2019-03-30 RX ADMIN — ACETAMINOPHEN 650 MG: 325 TABLET, FILM COATED ORAL at 06:03

## 2019-03-30 RX ADMIN — FENTANYL CITRATE 100 MCG: 50 INJECTION, SOLUTION INTRAMUSCULAR; INTRAVENOUS at 06:03

## 2019-03-30 RX ADMIN — PHENYLEPHRINE HYDROCHLORIDE 100 MCG: 10 INJECTION INTRAVENOUS at 02:03

## 2019-03-30 RX ADMIN — ACETAMINOPHEN 650 MG: 325 TABLET, FILM COATED ORAL at 12:03

## 2019-03-30 RX ADMIN — LIDOCAINE HYDROCHLORIDE,EPINEPHRINE BITARTRATE 10 ML: 20; .005 INJECTION, SOLUTION EPIDURAL; INFILTRATION; INTRACAUDAL; PERINEURAL at 06:03

## 2019-03-30 RX ADMIN — ACETAMINOPHEN 1000 MG: 10 INJECTION, SOLUTION INTRAVENOUS at 06:03

## 2019-03-30 RX ADMIN — SODIUM CHLORIDE, SODIUM LACTATE, POTASSIUM CHLORIDE, AND CALCIUM CHLORIDE 1000 ML: .6; .31; .03; .02 INJECTION, SOLUTION INTRAVENOUS at 01:03

## 2019-03-30 RX ADMIN — ONDANSETRON 4 MG: 2 INJECTION INTRAMUSCULAR; INTRAVENOUS at 06:03

## 2019-03-30 RX ADMIN — KETOROLAC TROMETHAMINE 30 MG: 30 INJECTION INTRAMUSCULAR; INTRAVENOUS at 12:03

## 2019-03-30 RX ADMIN — KETOROLAC TROMETHAMINE 30 MG: 30 INJECTION, SOLUTION INTRAMUSCULAR; INTRAVENOUS at 06:03

## 2019-03-30 RX ADMIN — PROMETHAZINE HYDROCHLORIDE 6.25 MG: 25 INJECTION INTRAMUSCULAR; INTRAVENOUS at 06:03

## 2019-03-30 RX ADMIN — FAMOTIDINE 20 MG: 10 INJECTION INTRAVENOUS at 06:03

## 2019-03-30 RX ADMIN — OXYTOCIN 2 UNITS: 10 INJECTION, SOLUTION INTRAMUSCULAR; INTRAVENOUS at 06:03

## 2019-03-30 NOTE — PROGRESS NOTES
03/30/19 0431   TeleStork Jeff Note - Strip   Strip Reviewed by Jeff Nurse? Yes   TeleSt[facility] Jeff Note - Communication   [place] Nurse Communicated with Bedside Nurse Regarding: Fetal Status   TeleStork Jeff Note - Notification   Nurse Notified? Yes   Name of Nurse NORTH Abad CN

## 2019-03-30 NOTE — PROGRESS NOTES
To bedside for placement of IUPC to better assess contraction/FHR pattern.     bpm, moderate variability, late decels resolved, no accels  CTX q4 min    SVE: unchanged @ 5/90/-2    Discussed with patient if further decels and/or no further improvement and still remove from delivery will likely move towards CS.    D/w Dr. Bam Tavera MD  OB/GYN, PGY-3

## 2019-03-30 NOTE — PROCEDURE NOTE ADDENDUM
Mother encouraged to provide breastmilk for her NICU baby. Benefits of breastmilk discussed with mother. Mother declines pumping at this time. Mother stated she wanted to rest at this time. Mother encouraged to inform nurse when she is ready to pump, supplies at bedside.

## 2019-03-30 NOTE — ANESTHESIA PROCEDURE NOTES
Epidural    Patient location during procedure: OB   Reason for block: primary anesthetic   Diagnosis: iup   Start time: 3/30/2019 1:40 AM  Timeout: 3/30/2019 1:40 AM  End time: 3/30/2019 2:02 AM  Surgery related to: Vaginal Delivery  Staffing  Anesthesiologist: Keysha Kirkpatrick MD  Resident/CRNA: Edison Ronquillo MD  Performed: anesthesiologist   Preanesthetic Checklist  Completed: patient identified, site marked, surgical consent, pre-op evaluation, timeout performed, IV checked, risks and benefits discussed, monitors and equipment checked, anesthesia consent given, hand hygiene performed and patient being monitored  Preparation  Patient position: sitting  Prep: ChloraPrep  Patient monitoring: Pulse Ox  Epidural  Skin Anesthetic: lidocaine 1%  Skin Wheal: 3 mL  Administration type: continuous  Approach: midline  Interspace: L3-4    Injection technique: TONEY saline and TONEY air  Needle and Epidural Catheter  Needle type: Tuohy   Needle gauge: 17  Needle length: 3.5 inches  Needle insertion depth: 9 cm  Catheter type: springwPecabu  Catheter size: 19 G  Catheter at skin depth: 14 cm  Test dose: 3 mL of lidocaine 1.5% with Epi 1-to-200,000  Additional Documentation: incremental injection, negative aspiration for heme and CSF, no paresthesia on injection, no signs/symptoms of IV or SA injection, no significant pain on injection and no significant complaints from patient  Needle localization: anatomical landmarks  Medications:  Volume per aspiration: 5 mL  Time between aspirations: 5 minutes  Assessment  Ease of block: moderate  Patient's tolerance of the procedure: comfortable throughout block and no complaintsNo inadvertent dural puncture with Tuohy.  Dural puncture not performed with spinal needle

## 2019-03-30 NOTE — PROGRESS NOTES
03/30/19 0552   TeleStork Jeff Note - Strip   Strip Reviewed by Jeff Nurse? Yes   Tele[facility] Jeff Note - Communication   [place] Nurse Communicated with Bedside Nurse Regarding: Fetal Status   TeleStork Jeff Note - Notification   Nurse Notified? Yes   Name of Nurse NORTH Abad CN

## 2019-03-30 NOTE — L&D DELIVERY NOTE
Section Procedure Note    Procedure:   1. Emergent primary  Section via pfannensteil skin incision    Indications:   1. non-reassuring fetal status    Pre-operative Diagnosis:   1. IUP at 39 week 2 day pregnancy  2. H/o syphilis this pregnancy     Post-operative Diagnosis:   same    Surgeon: Flor Kuhn MD     Assistants: Tawny Pringle MD - PGY1    Anesthesia: Epidural anesthesia    Findings:    1. Single viable  female infant, with APGARS 5/5/8, weight 3520g.   2. Normal appearing uterus, ovaries, and fallopian tubes.  3. Normal placenta    Qualitative Blood Loss:  835 mL           Total IV Fluids: 300 mL     UOP: 50 mL    Specimens: none    PreOp CBC:   Lab Results   Component Value Date    WBC 13.49 (H) 2019    HGB 8.3 (L) 2019    HCT 25.1 (L) 2019    MCV 86 2019     2019                     Complications:  None; patient tolerated the procedure well.           Disposition: PACU - hemodynamically stable.           Condition: stable    Procedure Details   Recurrent late decelerations noted despite multiple resuscitative efforts. Pt with minimal cervical change from 4 to 5cm over 4 hours. Discussed risks, benefits, complications, treatment options, and expected outcomes were discussed with the patient and she agreed with proceeding with . The patient was moved to OR,  identified as Noemí Elias and the procedure verified as  Delivery. A Time Out was held and the above information confirmed.  Preoperative antibiotics Ancef 2g was administered. Azithromycin was not administered secondary to patient allergy. Fetal heart tones were then found to be in the 60-70s. NICU was notified. Betadine prep was applied. An allis test was performed confirming adequate anesthesia.  A Pfannenstiel incision was made and carried down through the subcutaneous tissue to the fascia. Fascial incision was made and extended transversely bluntly. The  peritoneum was identified, found to be free of adherent bowel, and entered bluntly. Peritoneal incision was extended longitudinally. The vesico-uterine peritoneum was identified, and bladder blade was inserted.  A low transverse uterine incision was made with knife and extended with finger fracture. The infant was noted to be in cephalic presentation. The head was brought to the incision and elevated out of the pelvis. The patient delivered a single viable female infant without difficulty.  Infant weighed 3520 grams with Apgar scores of 5/5/8 at one, five and ten minutes respectively. After the umbilical cord was clamped and cut, cord gases and cord blood were obtained for evaluation. The placenta was removed intact, appeared normal, and was discarded. The uterus was exteriorized. The uterine incision was closed with running locked sutures of #1 chromic. Hemostasis was achieved with three figure of eight sutures of #1 chromic. The uterine outline, tubes and ovaries appeared normal. The uterus was returned to the abdominal cavity. Incision was reinspected and good hemostasis was noted.The fascia was then reapproximated with running sutures of #1 PDS. The skin was reapproximated with 4-0 monocryl.    Instrument, sponge, and needle counts were correct prior the abdominal closure and at the conclusion of the case.     Pt tolerated procedure well and was in stable condition after the procedure.      **NOTE: This patient is a candidate for trial of labor after  delivery**    Tawny Pringle MD   OBGYN, PGY-1       Delivery Information for  Harika Elias    Birth information:  YOB: 2020   Time of birth: 6:25 AM   Sex: female   Head Delivery Date/Time: 3/30/2019  6:25 AM   Delivery type: , Low Transverse   Gestational Age: 39w2d    Delivery Providers    Delivering clinician:  Flor Kuhn MD   Provider Role    NORTH Ho RN Nicole R. Koppi,  NORTH Pringle MD             Measurements    Weight:  3520 g  Length:           Apgars    Living status:  Living  Apgars:   1 min.:   5 min.:   10 min.:   15 min.:   20 min.:     Skin color:   1  1  1      Heart rate:   1  2  2      Reflex irritability:   1  0  1      Muscle tone:   1  1  2      Respiratory effort:   1  1  2      Total:   5  5  8             Operative Delivery    Forceps attempted?:  No  Vacuum extractor attempted?:  No         Shoulder Dystocia    Shoulder dystocia present?:  No           Presentation    Presentation:  Vertex  Position:  Left Occiput Anterior           Interventions/Resuscitation    Method:  NICU Attended       Cord    Vessels:  3 vessels  Complications:  None  Delayed Cord Clamping?:  No  Cord Clamped Date/Time:  3/30/2019  6:26 AM  Cord Blood Disposition:  Sent with Baby  Gases Sent?:  Yes  Stem Cell Collection (by MD):  No       Placenta    Placenta delivery date/time:  3/30/2019 0628  Placenta removal:  Spontaneous  Placenta appearance:  Intact  Placenta disposition:  discarded           Labor Events:       labor: No     Labor Onset Date/Time:         Dilation Complete Date/Time:         Start Pushing Date/Time:       Rupture Date/Time:              Rupture type:           Fluid Amount:        Fluid Color:        Fluid Odor:        Membrane Status (PeriCalm): SRM (Spontaneous Rupture)      Rupture Date/Time (PeriCalm): 2019 04:25:00      Fluid Amount (PeriCalm):        Fluid Color (PeriCalm):         steroids: None     Antibiotics given for GBS: No     Induction:       Indications for induction:        Augmentation:       Indications for augmentation:       Labor complications: Fetal Intolerance     Additional complications:          Cervical ripening:                     Delivery:      Episiotomy: None     Indication for Episiotomy:       Perineal Lacerations: None Repaired:      Periurethral Laceration:   Repaired:     Labial Laceration:    Repaired:     Sulcus Laceration:   Repaired:     Vaginal Laceration:   Repaired:     Cervical Laceration:   Repaired:     Repair suture: None     Repair # of packets:       Last Value - EBL - Nursing (mL): 0     Sum - EBL - Nursing (mL): 0     Last Value - EBL - Anesthesia (mL):      Calculated QBL (mL): 835      Vaginal Sweep Performed: No     Surgicount Correct: Yes       Other providers:       Anesthesia    Method:  Epidural          Details (if applicable):  Trial of Labor Yes    Categorization: Primary    Priority: Emergency   Indications for : Fetal Intolerance of Labor   Incision Type: low transverse     Additional  information:  Forceps:    Vacuum:    Breech:    Observed anomalies    Other (Comments): Change of shift, RN at final count SOLE Leahy RNC

## 2019-03-30 NOTE — PROGRESS NOTES
Dr. Jones notified of pt low output and vomiting. MD bowles for pt to have IV fluids. Will carry out order.

## 2019-03-30 NOTE — ANESTHESIA PREPROCEDURE EVALUATION
Noemí Elias is a 23 y.o. female  @ 39w2d presenting in labor. IUP complicated by syphilis this pregnancy (s/p treatment; last RPR nonreactive). Denies hx HTN,asthma, clotting disorders.        OB History    Para Term  AB Living   1             SAB TAB Ectopic Multiple Live Births                  # Outcome Date GA Lbr Maurilio/2nd Weight Sex Delivery Anes PTL Lv   1 Current                Wt Readings from Last 1 Encounters:   19 0024 115.3 kg (254 lb 3.1 oz)       BP Readings from Last 3 Encounters:   19 113/71   19 118/82   19 118/82       Patient Active Problem List   Diagnosis    Syphilis affecting pregnancy in first trimester    Normal labor       No past surgical history on file.    Social History     Socioeconomic History    Marital status: Single     Spouse name: Not on file    Number of children: Not on file    Years of education: Not on file    Highest education level: Not on file   Occupational History    Not on file   Social Needs    Financial resource strain: Not on file    Food insecurity:     Worry: Not on file     Inability: Not on file    Transportation needs:     Medical: Not on file     Non-medical: Not on file   Tobacco Use    Smoking status: Former Smoker    Smokeless tobacco: Former User   Substance and Sexual Activity    Alcohol use: No     Frequency: Never    Drug use: Yes     Types: Marijuana    Sexual activity: Yes   Lifestyle    Physical activity:     Days per week: Not on file     Minutes per session: Not on file    Stress: Not on file   Relationships    Social connections:     Talks on phone: Not on file     Gets together: Not on file     Attends Anabaptist service: Not on file     Active member of club or organization: Not on file     Attends meetings of clubs or organizations: Not on file     Relationship status: Not on file    Intimate partner violence:     Fear of current or ex partner: Not on file     Emotionally abused: Not on  file     Physically abused: Not on file     Forced sexual activity: Not on file   Other Topics Concern    Not on file   Social History Narrative    Not on file         Chemistry        Component Value Date/Time     09/05/2018 1007    K 3.6 09/05/2018 1007     09/05/2018 1007    CO2 19 (L) 09/05/2018 1007    BUN 6 09/05/2018 1007    CREATININE 0.7 09/05/2018 1007    GLU 78 09/05/2018 1007        Component Value Date/Time    CALCIUM 9.8 09/05/2018 1007    ESTGFRAFRICA >60 09/05/2018 1007    EGFRNONAA >60 09/05/2018 1007            Lab Results   Component Value Date    WBC 8.54 03/18/2019    HGB 11.7 (L) 03/18/2019    HCT 35.1 (L) 03/18/2019    MCV 87 03/18/2019     03/18/2019       No results for input(s): PT, INR, PROTIME, APTT in the last 72 hours.                  Anesthesia Evaluation         Review of Systems  Anesthesia Hx:  Neg history of prior surgery. Denies Personal Hx of Anesthesia complications.   Cardiovascular:  Cardiovascular Normal Exercise tolerance: good     Pulmonary:  Pulmonary Normal    Hepatic/GI:  Hepatic/GI Normal    OB/GYN/PEDS:  Planned Vaginal Delivery        Physical Exam  General:  Well nourished    Airway/Jaw/Neck:  Airway Findings: Mouth Opening: Normal Tongue: Normal  Mallampati: II        Eyes/Ears/Nose:  EYES/EARS/NOSE FINDINGS: Normal   Dental:  DENTAL FINDINGS: Normal   Chest/Lungs:  Chest/Lungs Clear    Heart/Vascular:  Heart Findings: Normal    Abdomen:  Abdomen Findings: Normal IUP   Musculoskeletal:  Musculoskeletal Findings: Normal   Skin:  Skin Findings: Normal    Mental Status:  Mental Status Findings: Normal        Anesthesia Plan  Type of Anesthesia, risks & benefits discussed:  Anesthesia Type:  epidural, general  Patient's Preference:   Intra-op Monitoring Plan: standard ASA monitors  Intra-op Monitoring Plan Comments:   Post Op Pain Control Plan: per primary service following discharge from PACU  Post Op Pain Control Plan Comments:   Induction:    IV  Beta Blocker:  Patient is not currently on a Beta-Blocker (No further documentation required).       Informed Consent: Patient understands risks and agrees with Anesthesia plan.  Questions answered. Anesthesia consent signed with patient.  ASA Score: 2     Day of Surgery Review of History & Physical: I have interviewed and examined the patient. I have reviewed the patient's H&P dated:            Ready For Surgery From Anesthesia Perspective.

## 2019-03-30 NOTE — H&P
HISTORY AND PHYSICAL                                                OBSTETRICS          Subjective:      Noemí Eilas is a 23 y.o. F at 39w2d presents complaining of ctx q4-5 mins.   This IUP is complicated by syphilis (s/p treatment, RPR nonreactive).  Patient denies  denies vaginal bleeding, denies LOF.   Fetal Movement: normal.     Review of Systems   Constitutional: Negative for chills and fever.   Eyes: Negative for visual disturbance.   Respiratory: Negative for shortness of breath.    Cardiovascular: Negative for chest pain and palpitations.   Gastrointestinal: Negative for abdominal pain, constipation, diarrhea, nausea and vomiting.   Genitourinary: Negative for vaginal bleeding and vaginal discharge.   Musculoskeletal: Negative for back pain.   Integumentary:  Negative for rash.   Neurological: Negative for seizures, syncope and headaches.   Hematological: Does not bruise/bleed easily.   Psychiatric/Behavioral: Negative for depression. The patient is not nervous/anxious.        PMHx: No past medical history on file.    PSHx: No past surgical history on file.    All:   Review of patient's allergies indicates:   Allergen Reactions    Zithromax [azithromycin]        Meds:   Medications Prior to Admission   Medication Sig Dispense Refill Last Dose    prenatal vit 10-iron-folic-dha (VITAFOL-OB+DHA) 65-1-250 mg Cmpk Take 1 tablet by mouth once daily. 30 each 7 Taking    promethazine (PHENERGAN) 25 MG tablet Take 1 tablet (25 mg total) by mouth every 4 (four) hours. 15 tablet 0 Taking       SH:   Social History     Socioeconomic History    Marital status: Single     Spouse name: Not on file    Number of children: Not on file    Years of education: Not on file    Highest education level: Not on file   Occupational History    Not on file   Social Needs    Financial resource strain: Not on file    Food insecurity:     Worry: Not on file     Inability: Not on file    Transportation needs:      "Medical: Not on file     Non-medical: Not on file   Tobacco Use    Smoking status: Former Smoker    Smokeless tobacco: Former User   Substance and Sexual Activity    Alcohol use: No     Frequency: Never    Drug use: Yes     Types: Marijuana    Sexual activity: Yes   Lifestyle    Physical activity:     Days per week: Not on file     Minutes per session: Not on file    Stress: Not on file   Relationships    Social connections:     Talks on phone: Not on file     Gets together: Not on file     Attends [employer] service: Not on file     Active member of club or organization: Not on file     Attends meetings of clubs or organizations: Not on file     Relationship status: Not on file    Intimate partner violence:     Fear of current or ex partner: Not on file     Emotionally abused: Not on file     Physically abused: Not on file     Forced sexual activity: Not on file   Other Topics Concern    Not on file   Social History Narrative    Not on file       FH:   Family History   Problem Relation Age of Onset    Breast cancer Mother     Colon cancer Neg Hx     Ovarian cancer Neg Hx        OBHx:   OB History    Para Term  AB Living   1 0 0 0 0 0   SAB TAB Ectopic Multiple Live Births   0 0 0 0 0      # Outcome Date GA Lbr Maurilio/2nd Weight Sex Delivery Anes PTL Lv   1 Current                Objective:       /71 (BP Location: Left arm, Patient Position: Lying)   Pulse 83   Temp 98.7 °F (37.1 °C) (Temporal)   Resp 18   Ht 5' 5" (1.651 m)   Wt 115.3 kg (254 lb 3.1 oz)   LMP 2018   SpO2 100%   Breastfeeding? No   BMI 42.30 kg/m²     Vitals:    19 0033 19 0038 19 0043 19 0048   BP:       BP Location:       Patient Position:       Pulse: 84 85 86 83   Resp:       Temp:       TempSrc:       SpO2: 98% 100% 100% 100%   Weight:       Height:       Physical Exam     Constitutional: She appears well-developed and well-nourished. She appears distressed.   HENT:   Head: " Normocephalic and atraumatic.   Cardiovascular: Normal rate, regular rhythm, normal heart sounds and intact distal pulses.   Pulmonary/Chest: Breath sounds normal.   Abdominal: Soft.   Neurological: She is alert and oriented to person, place, and time.   Skin: Skin is warm and dry.   Psychiatric: She has a normal mood and affect. Her behavior is normal. Judgment and thought content normal.      OB LABOR EXAM:         Method: Sterile vaginal exam per MD.         Dilatation: 4.   Station: -2.   Effacement: 70%.     Lab Review  Blood Type AB POS  GBBS: negative  Rubella: Immune  RPR: non-reactive  HIV: negative  HepB: negative       Assessment:       39w2d weeks gestation with normal labor    Active Hospital Problems    Diagnosis  POA    Normal labor [O80, Z37.9]  Not Applicable      Resolved Hospital Problems   No resolved problems to display.          Plan:     1. Normal labor   Risks, benefits, alternatives and possible complications have been discussed in detail with the patient.   - Consents signed and to chart  - Admit to Labor and Delivery unit  - expectant management   - Epidural per Anesthesia  - Draw CBC, T&S  - Notify Staff  - Recheck in 2 hrs or PRN    2. H/o syphilis infection this pregnancy  - treated by PCP  - RPR non-reactive, repeat pending    3. THC use  - utox pending    Post-Partum Hemorrhage risk - low    Tawny Pringle MD   OBGYN, PGY-1

## 2019-03-30 NOTE — ED PROVIDER NOTES
Encounter Date: 3/29/2019       History     Chief Complaint   Patient presents with    Contractions     HPI   Noemí Elias is a 23 y.o. F at 39w2d presents complaining of ctx q4-5 mins.   This IUP is complicated by syphilis (s/p treatment, RPR nonreactive).  Patient denies  denies vaginal bleeding, denies LOF.   Fetal Movement: normal.     Review of patient's allergies indicates:   Allergen Reactions    Zithromax [azithromycin]      No past medical history on file.  No past surgical history on file.  Family History   Problem Relation Age of Onset    Breast cancer Mother     Colon cancer Neg Hx     Ovarian cancer Neg Hx      Social History     Tobacco Use    Smoking status: Former Smoker    Smokeless tobacco: Former User   Substance Use Topics    Alcohol use: No     Frequency: Never    Drug use: Yes     Types: Marijuana     Review of Systems   Constitutional: Negative for chills and fever.   HENT: Negative for postnasal drip, rhinorrhea, sinus pressure and sore throat.    Eyes: Negative for photophobia and visual disturbance.   Respiratory: Negative for cough and shortness of breath.    Cardiovascular: Negative for chest pain and palpitations.   Gastrointestinal: Negative for nausea and vomiting.   Genitourinary: Negative for dysuria, frequency and urgency.   Musculoskeletal: Negative for back pain.   Skin: Negative for pallor and rash.   Neurological: Negative for dizziness, light-headedness and headaches.   Psychiatric/Behavioral: The patient is not nervous/anxious.        Physical Exam     Initial Vitals [19 0015]   BP Pulse Resp Temp SpO2   113/71 82 18 98.7 °F (37.1 °C) 100 %      MAP       --         Physical Exam    Constitutional: She appears well-developed and well-nourished. She appears distressed.   HENT:   Head: Normocephalic and atraumatic.   Cardiovascular: Normal rate, regular rhythm, normal heart sounds and intact distal pulses.   Pulmonary/Chest: Breath sounds normal.   Abdominal:  Soft.   Neurological: She is alert and oriented to person, place, and time.   Skin: Skin is warm and dry.   Psychiatric: She has a normal mood and affect. Her behavior is normal. Judgment and thought content normal.     OB LABOR EXAM:       Method: Sterile vaginal exam per MD.       Dilatation: 4.   Station: -2.   Effacement: 70%.             ED Course   Obtain Fetal nonstress test (NST)  Date/Time: 3/30/2019 1:32 AM  Performed by: Tawny Pringle MD  Authorized by: Tawny Pringle MD     Nonstress Test:     Variability:  6-25 BPM    Decelerations:  None    Accelerations:  15 bpm    Baseline:  135    Contractions:  Regular  Biophysical Profile:     Nonstress Test Interpretation: reactive      Overall Impression:  Reassuring      Labs Reviewed          Imaging Results    None          Medical Decision Making:   ED Management:  - NST reactive and reassuring, regular ctx q2-4 mins  - SVE: 4/70/-2 with regular, painful contractions  - admit to L&D   - consents signed  - vertex presentation                      Clinical Impression:       ICD-10-CM ICD-9-CM   1. 39 weeks gestation of pregnancy Z3A.39 V22.2   2. Normal labor O80 650    Z37.9                                 Tawny Pringle MD  Resident  03/30/19 0140

## 2019-03-30 NOTE — PROGRESS NOTES
"LABOR NOTE    S:  Complaints: No.  Epidural working:  yes  To bedside for FHR decelerations.     O: /71 (BP Location: Left arm, Patient Position: Lying)   Pulse 83   Temp 98.7 °F (37.1 °C) (Temporal)   Resp 18   Ht 5' 5" (1.651 m)   Wt 115.3 kg (254 lb 3.1 oz)   LMP 2018   SpO2 100%   Breastfeeding? No   BMI 42.30 kg/m²       FHT: 160 bpm, min variability, +late decelerations to 60s, no accels -  CTX: q 3 minutes  SVE: /-2  SROM on exam, thick meconium      ASSESSMENT:   23 y.o.  at 39w2d, normal labor    FHTs abnormal    Active Hospital Problems    Diagnosis  POA    Normal labor [O80, Z37.9]  Not Applicable      Resolved Hospital Problems   No resolved problems to display.     Labor course  0230: /-2, late decels after epidural placement, Bps in 90s/50s, bolus given, anesthesia notified, phenylephrine administered with improvement in BP to 110/70s, plan to AROM at next check  0430: /-2, unchanged, SROM on exam    PLAN:    Continue Close Maternal/Fetal Monitoring  FHR abnormal- discussed with patient if no improvement or recurrent late decelerations will need to move towards  delivery. She voices understanding.    Moni Tavera MD  OB/GYN, PGY-3          "

## 2019-03-30 NOTE — PROGRESS NOTES
"LABOR NOTE    S:  Complaints: No.  Epidural working:  yes  MD to bedside for routine cervical exam    O: /71 (BP Location: Left arm, Patient Position: Lying)   Pulse 83   Temp 98.7 °F (37.1 °C) (Temporal)   Resp 18   Ht 5' 5" (1.651 m)   Wt 115.3 kg (254 lb 3.1 oz)   LMP 2018   SpO2 100%   Breastfeeding? No   BMI 42.30 kg/m²       FHT: 150 Cat 2 (reassuring), pt with late decels to 80s-90s that resolved, + accels  CTX: q 2-3 minutes  SVE: /-2      ASSESSMENT:   23 y.o.  at 39w2d, normal labor    FHT reassuring    Active Hospital Problems    Diagnosis  POA    Normal labor [O80, Z37.9]  Not Applicable      Resolved Hospital Problems   No resolved problems to display.     Labor course  0230: /-2, late decels after epidural placement, Bps in 90s/50s, bolus given, anesthesia notified, phenylephrine administered with improvement in BP to 110/70s, plan to AROM at next check    PLAN:    Continue Close Maternal/Fetal Monitoring  Expectant management  Recheck 2 hours or PRN      Tawny Pringle MD   OBGYN, PGY-1        "

## 2019-03-30 NOTE — TRANSFER OF CARE
"Anesthesia Transfer of Care Note    Patient: Noemí Elias    Procedure(s) Performed: * No procedures listed *    Patient location: PACU    Anesthesia Type: MAC and epidural    Transport from OR: Transported from OR on room air with adequate spontaneous ventilation    Post pain: adequate analgesia    Post assessment: no apparent anesthetic complications    Post vital signs: stable    Level of consciousness: awake, alert and oriented    Nausea/Vomiting: no nausea/vomiting    Complications: none          Last vitals:   Visit Vitals  /78   Pulse 72   Temp 37.1 °C (98.7 °F) (Temporal)   Resp 18   Ht 5' 5" (1.651 m)   Wt 115.3 kg (254 lb 3.1 oz)   LMP 06/28/2018   SpO2 100%   Breastfeeding? No   BMI 42.30 kg/m²     "

## 2019-03-31 LAB
ALLENS TEST: ABNORMAL
BASOPHILS # BLD AUTO: 0.01 K/UL (ref 0–0.2)
BASOPHILS NFR BLD: 0.1 % (ref 0–1.9)
DELSYS: ABNORMAL
DIFFERENTIAL METHOD: ABNORMAL
EOSINOPHIL # BLD AUTO: 0.1 K/UL (ref 0–0.5)
EOSINOPHIL NFR BLD: 0.4 % (ref 0–8)
ERYTHROCYTE [DISTWIDTH] IN BLOOD BY AUTOMATED COUNT: 13.6 % (ref 11.5–14.5)
HCO3 UR-SCNC: 22.5 MMOL/L (ref 24–28)
HCT VFR BLD AUTO: 25.1 % (ref 37–48.5)
HGB BLD-MCNC: 8.3 G/DL (ref 12–16)
LYMPHOCYTES # BLD AUTO: 2.4 K/UL (ref 1–4.8)
LYMPHOCYTES NFR BLD: 18.1 % (ref 18–48)
MCH RBC QN AUTO: 28.4 PG (ref 27–31)
MCHC RBC AUTO-ENTMCNC: 33.1 G/DL (ref 32–36)
MCV RBC AUTO: 86 FL (ref 82–98)
MODE: ABNORMAL
MONOCYTES # BLD AUTO: 1.2 K/UL (ref 0.3–1)
MONOCYTES NFR BLD: 8.7 % (ref 4–15)
NEUTROPHILS # BLD AUTO: 9.7 K/UL (ref 1.8–7.7)
NEUTROPHILS NFR BLD: 72 % (ref 38–73)
PCO2 BLDA: 51.7 MMHG (ref 35–45)
PH SMN: 7.25 [PH] (ref 7.35–7.45)
PLATELET # BLD AUTO: 279 K/UL (ref 150–350)
PMV BLD AUTO: 10.2 FL (ref 9.2–12.9)
PO2 BLDA: 9 MMHG (ref 80–100)
POC BE: -5 MMOL/L
POC SATURATED O2: 6 % (ref 95–100)
RBC # BLD AUTO: 2.92 M/UL (ref 4–5.4)
SAMPLE: ABNORMAL
SITE: ABNORMAL
WBC # BLD AUTO: 13.49 K/UL (ref 3.9–12.7)

## 2019-03-31 PROCEDURE — 63600175 PHARM REV CODE 636 W HCPCS: Performed by: STUDENT IN AN ORGANIZED HEALTH CARE EDUCATION/TRAINING PROGRAM

## 2019-03-31 PROCEDURE — 99232 PR SUBSEQUENT HOSPITAL CARE,LEVL II: ICD-10-PCS | Mod: ,,, | Performed by: OBSTETRICS & GYNECOLOGY

## 2019-03-31 PROCEDURE — 25000003 PHARM REV CODE 250: Performed by: STUDENT IN AN ORGANIZED HEALTH CARE EDUCATION/TRAINING PROGRAM

## 2019-03-31 PROCEDURE — 99232 SBSQ HOSP IP/OBS MODERATE 35: CPT | Mod: ,,, | Performed by: OBSTETRICS & GYNECOLOGY

## 2019-03-31 PROCEDURE — 11000001 HC ACUTE MED/SURG PRIVATE ROOM

## 2019-03-31 PROCEDURE — 36415 COLL VENOUS BLD VENIPUNCTURE: CPT

## 2019-03-31 PROCEDURE — 85025 COMPLETE CBC W/AUTO DIFF WBC: CPT

## 2019-03-31 RX ORDER — DIPHENHYDRAMINE HCL 25 MG
25 CAPSULE ORAL EVERY 4 HOURS PRN
Status: DISCONTINUED | OUTPATIENT
Start: 2019-03-31 | End: 2019-04-02 | Stop reason: HOSPADM

## 2019-03-31 RX ORDER — IBUPROFEN 400 MG/1
400 TABLET ORAL EVERY 6 HOURS PRN
Status: DISCONTINUED | OUTPATIENT
Start: 2019-03-31 | End: 2019-03-31

## 2019-03-31 RX ORDER — IBUPROFEN 600 MG/1
600 TABLET ORAL EVERY 6 HOURS
Status: DISCONTINUED | OUTPATIENT
Start: 2019-03-31 | End: 2019-04-02 | Stop reason: HOSPADM

## 2019-03-31 RX ORDER — HYDROCORTISONE 25 MG/G
CREAM TOPICAL 3 TIMES DAILY PRN
Status: DISCONTINUED | OUTPATIENT
Start: 2019-03-31 | End: 2019-04-02 | Stop reason: HOSPADM

## 2019-03-31 RX ORDER — ADHESIVE BANDAGE
30 BANDAGE TOPICAL 2 TIMES DAILY PRN
Status: DISCONTINUED | OUTPATIENT
Start: 2019-04-01 | End: 2019-04-02 | Stop reason: HOSPADM

## 2019-03-31 RX ORDER — ONDANSETRON 8 MG/1
8 TABLET, ORALLY DISINTEGRATING ORAL EVERY 8 HOURS PRN
Status: DISCONTINUED | OUTPATIENT
Start: 2019-03-31 | End: 2019-04-02 | Stop reason: HOSPADM

## 2019-03-31 RX ORDER — BISACODYL 10 MG
10 SUPPOSITORY, RECTAL RECTAL ONCE AS NEEDED
Status: DISCONTINUED | OUTPATIENT
Start: 2019-03-31 | End: 2019-04-02 | Stop reason: HOSPADM

## 2019-03-31 RX ORDER — MUPIROCIN 20 MG/G
1 OINTMENT TOPICAL 2 TIMES DAILY
Status: CANCELLED | OUTPATIENT
Start: 2019-03-31 | End: 2019-04-05

## 2019-03-31 RX ORDER — AMOXICILLIN 250 MG
1 CAPSULE ORAL NIGHTLY PRN
Status: DISCONTINUED | OUTPATIENT
Start: 2019-03-31 | End: 2019-04-02 | Stop reason: HOSPADM

## 2019-03-31 RX ORDER — DOCUSATE SODIUM 100 MG/1
200 CAPSULE, LIQUID FILLED ORAL 2 TIMES DAILY
Status: DISCONTINUED | OUTPATIENT
Start: 2019-03-31 | End: 2019-04-02 | Stop reason: HOSPADM

## 2019-03-31 RX ORDER — IRON POLYSACCHARIDE COMPLEX 150 MG
150 CAPSULE ORAL DAILY
Status: DISCONTINUED | OUTPATIENT
Start: 2019-03-31 | End: 2019-04-02 | Stop reason: HOSPADM

## 2019-03-31 RX ORDER — SIMETHICONE 80 MG
1 TABLET,CHEWABLE ORAL EVERY 6 HOURS PRN
Status: DISCONTINUED | OUTPATIENT
Start: 2019-03-31 | End: 2019-04-02 | Stop reason: HOSPADM

## 2019-03-31 RX ORDER — HYDROCODONE BITARTRATE AND ACETAMINOPHEN 10; 325 MG/1; MG/1
1 TABLET ORAL EVERY 4 HOURS PRN
Status: DISCONTINUED | OUTPATIENT
Start: 2019-03-31 | End: 2019-04-02 | Stop reason: HOSPADM

## 2019-03-31 RX ORDER — HYDROCODONE BITARTRATE AND ACETAMINOPHEN 5; 325 MG/1; MG/1
1 TABLET ORAL EVERY 4 HOURS PRN
Status: DISCONTINUED | OUTPATIENT
Start: 2019-03-31 | End: 2019-04-02 | Stop reason: HOSPADM

## 2019-03-31 RX ADMIN — OXYCODONE HYDROCHLORIDE 10 MG: 5 TABLET ORAL at 05:03

## 2019-03-31 RX ADMIN — IBUPROFEN 600 MG: 600 TABLET ORAL at 12:03

## 2019-03-31 RX ADMIN — DOCUSATE SODIUM 200 MG: 100 CAPSULE, LIQUID FILLED ORAL at 07:03

## 2019-03-31 RX ADMIN — OXYCODONE HYDROCHLORIDE 5 MG: 5 TABLET ORAL at 12:03

## 2019-03-31 RX ADMIN — HYDROCODONE BITARTRATE AND ACETAMINOPHEN 1 TABLET: 10; 325 TABLET ORAL at 12:03

## 2019-03-31 RX ADMIN — KETOROLAC TROMETHAMINE 30 MG: 30 INJECTION INTRAMUSCULAR; INTRAVENOUS at 12:03

## 2019-03-31 RX ADMIN — IBUPROFEN 600 MG: 600 TABLET ORAL at 05:03

## 2019-03-31 RX ADMIN — HYDROCODONE BITARTRATE AND ACETAMINOPHEN 1 TABLET: 10; 325 TABLET ORAL at 07:03

## 2019-03-31 RX ADMIN — ACETAMINOPHEN 650 MG: 325 TABLET, FILM COATED ORAL at 12:03

## 2019-03-31 RX ADMIN — HYDROCODONE BITARTRATE AND ACETAMINOPHEN 1 TABLET: 10; 325 TABLET ORAL at 08:03

## 2019-03-31 RX ADMIN — IBUPROFEN 600 MG: 600 TABLET ORAL at 06:03

## 2019-03-31 RX ADMIN — DOCUSATE SODIUM 200 MG: 100 CAPSULE, LIQUID FILLED ORAL at 08:03

## 2019-03-31 RX ADMIN — Medication 150 MG: at 08:03

## 2019-03-31 RX ADMIN — SIMETHICONE CHEW TAB 80 MG 80 MG: 80 TABLET ORAL at 07:03

## 2019-03-31 RX ADMIN — HYDROCODONE BITARTRATE AND ACETAMINOPHEN 1 TABLET: 10; 325 TABLET ORAL at 04:03

## 2019-03-31 NOTE — PROGRESS NOTES
POSTPARTUM PROGRESS NOTE     Noemí Elias is a 23 y.o. female POD #1 status post Emergent Primary  section at 39w2d in a pregnancy complicated by syphilis (s/p treatment, RPR nonreactive), THC use. Patient is doing well this morning. She denies nausea, vomiting, fever or chills.  Patient reports moderate abdominal pain that is adequately relieved by oral pain medications. Lochia is mild and stable. Patient is voiding without difficulty and ambulating with no difficulty. She has not passed flatus, and has not had BM.  Patient does not plan to breast feed. Primary OB for contraception.     Objective:       Temp:  [95.9 °F (35.5 °C)-98.3 °F (36.8 °C)] 97.9 °F (36.6 °C)  Pulse:  [] 68  Resp:  [0-20] 18  SpO2:  [98 %-100 %] 100 %  BP: ()/(42-80) 106/63    General:   alert, appears stated age and cooperative   Lungs:   clear to auscultation bilaterally   Heart:   regular rate and rhythm, S1, S2 normal, no murmur, click, rub or gallop   Abdomen:  soft, non-tender; bowel sounds normal; no masses,  no organomegaly   Uterus:  firm located at the umblicus.        Incision: Bandage in place, clean, dry and intact   Extremities: peripheral pulses normal, no pedal edema, no clubbing or cyanosis     Lab Review  Recent Results (from the past 4 hour(s))   CBC auto differential    Collection Time: 19  4:47 AM   Result Value Ref Range    WBC 13.49 (H) 3.90 - 12.70 K/uL    RBC 2.92 (L) 4.00 - 5.40 M/uL    Hemoglobin 8.3 (L) 12.0 - 16.0 g/dL    Hematocrit 25.1 (L) 37.0 - 48.5 %    MCV 86 82 - 98 fL    MCH 28.4 27.0 - 31.0 pg    MCHC 33.1 32.0 - 36.0 g/dL    RDW 13.6 11.5 - 14.5 %    Platelets 279 150 - 350 K/uL    MPV 10.2 9.2 - 12.9 fL    Gran # (ANC) 9.7 (H) 1.8 - 7.7 K/uL    Lymph # 2.4 1.0 - 4.8 K/uL    Mono # 1.2 (H) 0.3 - 1.0 K/uL    Eos # 0.1 0.0 - 0.5 K/uL    Baso # 0.01 0.00 - 0.20 K/uL    Gran% 72.0 38.0 - 73.0 %    Lymph% 18.1 18.0 - 48.0 %    Mono% 8.7 4.0 - 15.0 %    Eosinophil% 0.4 0.0 - 8.0 %     Basophil% 0.1 0.0 - 1.9 %    Differential Method Automated        I/O    Intake/Output Summary (Last 24 hours) at 3/31/2019 0641  Last data filed at 3/31/2019 0500  Gross per 24 hour   Intake 300 ml   Output 3395 ml   Net -3095 ml        Assessment:     Patient Active Problem List   Diagnosis    Syphilis affecting pregnancy in first trimester    Normal labor    S/P emergency         Plan:   1. Postpartum care:  - Patient doing well. Continue routine management and advances.  - Continue PO pain meds. Pain well controlled.  - Encourage ambulation  - Contraception per primary OB  - Lactation consult PRN    2. Acute blood loss anemia  - Heme: H/h 11.3/33.7 > 8.3/25.1  - VSS, asymptomatic  - fe/colace    3. H/o syphilis infection this pregnancy  - treated by PCP  - RPR non-reactive on 3/18    4. H/o of THC use  - negative utox on admit      Dispo: As patient meets milestones, will plan to discharge POD2-4.      Tawny Pringle MD   OBGYN, PGY-1

## 2019-03-31 NOTE — ANESTHESIA POSTPROCEDURE EVALUATION
Anesthesia Post Evaluation    Patient: Noemí Elias    Procedure(s) Performed: * No procedures listed *    Final Anesthesia Type: epidural  Patient location during evaluation: labor & delivery  Patient participation: Yes- Able to Participate  Level of consciousness: awake and alert  Post-procedure vital signs: reviewed and stable  Pain management: adequate  Airway patency: patent  PONV status at discharge: No PONV  Anesthetic complications: no      Cardiovascular status: blood pressure returned to baseline  Respiratory status: unassisted  Hydration status: euvolemic  Follow-up not needed.          Vitals Value Taken Time   /64 3/31/2019  8:00 AM   Temp 36.6 °C (97.8 °F) 3/31/2019  8:00 AM   Pulse 77 3/31/2019  8:00 AM   Resp 20 3/31/2019  8:00 AM   SpO2 100 % 3/31/2019  8:00 AM         No case tracking events are documented in the log.      Pain/Bar Score: Pain Rating Prior to Med Admin: 7 (3/31/2019 12:08 PM)  Pain Rating Post Med Admin: 4 (3/31/2019  1:00 PM)

## 2019-03-31 NOTE — LACTATION NOTE
"Lactation note:   LC to room. Benefits of breastfeeding discussed with mother and family member. Mother not sure if she wants to breastfeed or breast pump stating it "feels weird." Discussed mother's feeding goals and options including pumping and bottlefeeding. Many questions answered. Mother does not want to pump to provide breastmilk and does not plan to put baby to breast. LC encouraged mother to let RN know if she changes her mind and let lactation know. Pump and supplies left in room in case mother changes her mind while in hospital.  number on board.   "

## 2019-04-01 PROBLEM — Z37.9 NORMAL LABOR: Status: RESOLVED | Noted: 2019-03-30 | Resolved: 2019-04-01

## 2019-04-01 PROCEDURE — 11000001 HC ACUTE MED/SURG PRIVATE ROOM

## 2019-04-01 PROCEDURE — 99232 SBSQ HOSP IP/OBS MODERATE 35: CPT | Mod: TH,,, | Performed by: OBSTETRICS & GYNECOLOGY

## 2019-04-01 PROCEDURE — 27000181 HC CABLE, IUPC

## 2019-04-01 PROCEDURE — 99232 PR SUBSEQUENT HOSPITAL CARE,LEVL II: ICD-10-PCS | Mod: TH,,, | Performed by: OBSTETRICS & GYNECOLOGY

## 2019-04-01 PROCEDURE — 25000003 PHARM REV CODE 250: Performed by: STUDENT IN AN ORGANIZED HEALTH CARE EDUCATION/TRAINING PROGRAM

## 2019-04-01 RX ORDER — HYDROCODONE BITARTRATE AND ACETAMINOPHEN 5; 325 MG/1; MG/1
1 TABLET ORAL EVERY 4 HOURS PRN
Qty: 20 TABLET | Refills: 0 | Status: SHIPPED | OUTPATIENT
Start: 2019-04-01 | End: 2022-02-09

## 2019-04-01 RX ORDER — IRON POLYSACCHARIDE COMPLEX 150 MG
150 CAPSULE ORAL DAILY
Qty: 30 CAPSULE | Refills: 0 | COMMUNITY
Start: 2019-04-02 | End: 2022-01-11

## 2019-04-01 RX ORDER — IBUPROFEN 600 MG/1
600 TABLET ORAL EVERY 6 HOURS
Qty: 30 TABLET | Refills: 1 | Status: SHIPPED | OUTPATIENT
Start: 2019-04-01 | End: 2022-02-09

## 2019-04-01 RX ADMIN — HYDROCODONE BITARTRATE AND ACETAMINOPHEN 1 TABLET: 10; 325 TABLET ORAL at 09:04

## 2019-04-01 RX ADMIN — HYDROCODONE BITARTRATE AND ACETAMINOPHEN 1 TABLET: 10; 325 TABLET ORAL at 12:04

## 2019-04-01 RX ADMIN — HYDROCODONE BITARTRATE AND ACETAMINOPHEN 1 TABLET: 10; 325 TABLET ORAL at 04:04

## 2019-04-01 RX ADMIN — DOCUSATE SODIUM 200 MG: 100 CAPSULE, LIQUID FILLED ORAL at 08:04

## 2019-04-01 RX ADMIN — Medication 150 MG: at 08:04

## 2019-04-01 RX ADMIN — DOCUSATE SODIUM 200 MG: 100 CAPSULE, LIQUID FILLED ORAL at 09:04

## 2019-04-01 RX ADMIN — HYDROCODONE BITARTRATE AND ACETAMINOPHEN 1 TABLET: 5; 325 TABLET ORAL at 12:04

## 2019-04-01 RX ADMIN — IBUPROFEN 600 MG: 600 TABLET ORAL at 11:04

## 2019-04-01 RX ADMIN — IBUPROFEN 600 MG: 600 TABLET ORAL at 12:04

## 2019-04-01 RX ADMIN — HYDROCODONE BITARTRATE AND ACETAMINOPHEN 1 TABLET: 10; 325 TABLET ORAL at 08:04

## 2019-04-01 RX ADMIN — SIMETHICONE CHEW TAB 80 MG 80 MG: 80 TABLET ORAL at 05:04

## 2019-04-01 RX ADMIN — IBUPROFEN 600 MG: 600 TABLET ORAL at 05:04

## 2019-04-01 RX ADMIN — HYDROCODONE BITARTRATE AND ACETAMINOPHEN 1 TABLET: 10; 325 TABLET ORAL at 05:04

## 2019-04-01 RX ADMIN — IBUPROFEN 600 MG: 600 TABLET ORAL at 06:04

## 2019-04-01 NOTE — PLAN OF CARE
Problem: Infection ( Delivery)  Goal: Absence of Infection Signs/Symptoms  Will continue to monitor pt for S/S of infection

## 2019-04-01 NOTE — PROGRESS NOTES
POSTPARTUM PROGRESS NOTE     Noemí Elias is a 23 y.o. female POD #2 status post Emergent Primary  section at 39w2d in a pregnancy complicated by syphilis (s/p treatment, RPR nonreactive), THC use. Patient is doing well this morning. She denies nausea, vomiting, fever or chills.  Patient reports moderate abdominal pain that is adequately relieved by oral pain medications. Lochia is mild and stable. Patient is voiding without difficulty and ambulating with no difficulty. She has not passed flatus, and has not had BM.  Patient does not plan to breast feed. Primary OB for contraception.     Objective:       Temp:  [97.8 °F (36.6 °C)-98.2 °F (36.8 °C)] 98.2 °F (36.8 °C)  Pulse:  [75-83] 83  Resp:  [18-20] 18  SpO2:  [100 %] 100 %  BP: (112-131)/(62-64) 122/64    General:   alert, appears stated age and cooperative   Lungs:   clear to auscultation bilaterally   Heart:   regular rate and rhythm, S1, S2 normal, no murmur, click, rub or gallop   Abdomen:  soft, non-tender; bowel sounds normal; no masses,  no organomegaly   Uterus:  firm located at the umblicus.        Incision: Incision, clean, dry and intact   Extremities: peripheral pulses normal, no pedal edema, no clubbing or cyanosis     Lab Review  No results found for this or any previous visit (from the past 4 hour(s)).    I/O  No intake or output data in the 24 hours ending 19 0611     Assessment:     Patient Active Problem List   Diagnosis    Syphilis affecting pregnancy in first trimester    Normal labor    S/P emergency         Plan:   1. Postpartum care:  - Patient doing well. Continue routine management and advances.  - Continue PO pain meds. Pain well controlled.  - Encourage ambulation  - Contraception per primary OB  - Lactation consult PRN    2. Acute blood loss anemia  - Heme: H/h 11.3/33.7 > 8.3/25.1  - VSS, asymptomatic  - fe/colace    3. H/o syphilis infection this pregnancy  - treated by PCP  - RPR non-reactive on 3/18    4.  H/o of THC use  - negative utox on admit      Dispo: As patient meets milestones, will plan to discharge POD2-4.      Gardenia GARIBAY  PGY2

## 2019-04-01 NOTE — PLAN OF CARE
Problem: Adult Inpatient Plan of Care  Goal: Plan of Care Review  Vital signs stable. Ambulating and voiding without difficulty. Vaginal bleeding is moderate. Incision clean, dry, and intact. Discussed POC with patient, patient verbalized understanding. Pt stable with no distress noted. Will continue to monitor.

## 2019-04-01 NOTE — PLAN OF CARE
Problem: Bleeding ( Delivery)  Goal: Bleeding is Controlled  Will continue to monitor pt bleeding.

## 2019-04-02 VITALS
HEIGHT: 65 IN | DIASTOLIC BLOOD PRESSURE: 68 MMHG | RESPIRATION RATE: 18 BRPM | WEIGHT: 254.19 LBS | SYSTOLIC BLOOD PRESSURE: 110 MMHG | TEMPERATURE: 98 F | BODY MASS INDEX: 42.35 KG/M2 | OXYGEN SATURATION: 100 % | HEART RATE: 90 BPM

## 2019-04-02 PROCEDURE — 25000003 PHARM REV CODE 250: Performed by: STUDENT IN AN ORGANIZED HEALTH CARE EDUCATION/TRAINING PROGRAM

## 2019-04-02 PROCEDURE — 99238 HOSP IP/OBS DSCHRG MGMT 30/<: CPT | Mod: ,,, | Performed by: OBSTETRICS & GYNECOLOGY

## 2019-04-02 PROCEDURE — 90715 TDAP VACCINE 7 YRS/> IM: CPT | Performed by: STUDENT IN AN ORGANIZED HEALTH CARE EDUCATION/TRAINING PROGRAM

## 2019-04-02 PROCEDURE — 90471 IMMUNIZATION ADMIN: CPT | Performed by: STUDENT IN AN ORGANIZED HEALTH CARE EDUCATION/TRAINING PROGRAM

## 2019-04-02 PROCEDURE — 99238 PR HOSPITAL DISCHARGE DAY,<30 MIN: ICD-10-PCS | Mod: ,,, | Performed by: OBSTETRICS & GYNECOLOGY

## 2019-04-02 PROCEDURE — 63600175 PHARM REV CODE 636 W HCPCS: Performed by: STUDENT IN AN ORGANIZED HEALTH CARE EDUCATION/TRAINING PROGRAM

## 2019-04-02 RX ADMIN — Medication 150 MG: at 08:04

## 2019-04-02 RX ADMIN — HYDROCODONE BITARTRATE AND ACETAMINOPHEN 1 TABLET: 10; 325 TABLET ORAL at 06:04

## 2019-04-02 RX ADMIN — DOCUSATE SODIUM 200 MG: 100 CAPSULE, LIQUID FILLED ORAL at 08:04

## 2019-04-02 RX ADMIN — HYDROCODONE BITARTRATE AND ACETAMINOPHEN 1 TABLET: 10; 325 TABLET ORAL at 01:04

## 2019-04-02 RX ADMIN — IBUPROFEN 600 MG: 600 TABLET ORAL at 05:04

## 2019-04-02 RX ADMIN — IBUPROFEN 600 MG: 600 TABLET ORAL at 11:04

## 2019-04-02 RX ADMIN — CLOSTRIDIUM TETANI TOXOID ANTIGEN (FORMALDEHYDE INACTIVATED), CORYNEBACTERIUM DIPHTHERIAE TOXOID ANTIGEN (FORMALDEHYDE INACTIVATED), BORDETELLA PERTUSSIS TOXOID ANTIGEN (GLUTARALDEHYDE INACTIVATED), BORDETELLA PERTUSSIS FILAMENTOUS HEMAGGLUTININ ANTIGEN (FORMALDEHYDE INACTIVATED), BORDETELLA PERTUSSIS PERTACTIN ANTIGEN, AND BORDETELLA PERTUSSIS FIMBRIAE 2/3 ANTIGEN 0.5 ML: 5; 2; 2.5; 5; 3; 5 INJECTION, SUSPENSION INTRAMUSCULAR at 10:04

## 2019-04-02 NOTE — PROGRESS NOTES
POSTPARTUM PROGRESS NOTE     Noemí Elias is a 23 y.o. female POD #3 status post Emergent Primary  section at 39w2d in a pregnancy complicated by syphilis (s/p treatment, RPR nonreactive), THC use. Patient is doing well this morning. She denies nausea, vomiting, fever or chills.  Patient reports moderate abdominal pain that is adequately relieved by oral pain medications. Lochia is mild and stable. Patient is voiding without difficulty and ambulating with no difficulty. She has not passed flatus, and has not had BM.  Patient does not plan to breast feed. Primary OB for contraception.     Objective:       Temp:  [98 °F (36.7 °C)-98.5 °F (36.9 °C)] 98 °F (36.7 °C)  Pulse:  [75-92] 75  Resp:  [18] 18  SpO2:  [98 %-100 %] 98 %  BP: (128-142)/(57-87) 130/87    General:   alert, appears stated age and cooperative   Lungs:   clear to auscultation bilaterally   Heart:   regular rate and rhythm, S1, S2 normal, no murmur, click, rub or gallop   Abdomen:  soft, non-tender; bowel sounds normal; no masses,  no organomegaly   Uterus:  firm located at the umblicus.        Incision: Incision, clean, dry and intact   Extremities: peripheral pulses normal, no pedal edema, no clubbing or cyanosis     Lab Review  No results found for this or any previous visit (from the past 4 hour(s)).    I/O  No intake or output data in the 24 hours ending 19 0645     Assessment:     Patient Active Problem List   Diagnosis    Syphilis affecting pregnancy in first trimester    S/P emergency         Plan:   1. Postpartum care:  - Patient doing well. Continue routine management and advances.  - Continue PO pain meds. Pain well controlled.  - Encourage ambulation  - Contraception per primary OB  - Lactation consult PRN    2. Acute blood loss anemia  - Heme: H/h 11.3/33.7 > 8.3/25.1  - VSS, asymptomatic  - fe/colace    3. H/o syphilis infection this pregnancy  - treated by PCP  - RPR non-reactive on 3/18    4. H/o of THC use  -  negative utox on admit      Dispo: As patient meets milestones, will plan to discharge today.      Gardenia Ramos M.D.  OBGYN  PGY2    Doing well, no questions,no problems.  I have reviewed the resident's note, evaluated the patient and agree with the diagnosis and management plan

## 2019-04-02 NOTE — NURSING
Pt AA0x3 and VSS. Mother at bedside. Two side rails up, bed locked, call light within reach. No falls noted as these precautions remain. Pt free of skin breakdown as the pt moves well independently.  Voiding without difficulty. passing gas. Fundus firm and midline. Vaginal bleeding light. Low transverse incision open to air with sutures. Discharge paperwork reviewed along with instructions, follow up appointment and prescriptions, pt verbalized understanding.

## 2019-04-02 NOTE — PLAN OF CARE
Met with pt after consult ordered.    Introduced self and explained sw role. Pt verbalized to nurse that she would not have the means to get formula until she got to WIC. Pt reported to Sw that she just got WIC appt at [facility] on [address] on Thursday (19). Pt has food stamps but does not have any money left on the food stamp card until 19. Sw explained to pt that the hospital does not provide formula at time of d/c to hold you until getting to WIC. Sw offered to contact child protective services for possible assistance even though that was not a guarantee of assistance. Pt reported she would find someone to get her a can of formula until she could get to WIC. Pt encouraged to call her WIC clinic to see if they offer cans for emergencies. Pt reported she would contact her clinic to ask. Pt again reassured sw that she would find someone to purchase formula for her  until she gets to WIC.     [place] will be following up with Dr. Mccain in [facility].     No further d/c planning needs.       Tamiko Long LCSW    [facility]  [email or web address]    (phone) [phone] or  Z[place]. [phone]  (fax) [phone]

## 2019-04-02 NOTE — DISCHARGE SUMMARY
Delivery Discharge Summary  Obstetrics    Admission date: 3/30/2019  Discharge date: 2019    Disposition: To home, self care    Admit Dx:      Patient Active Problem List   Diagnosis    Syphilis affecting pregnancy in first trimester    S/P emergency      Discharge Dx:    Patient Active Problem List   Diagnosis    Syphilis affecting pregnancy in first trimester    S/P emergency        Procedure: , due to non reassuring FHTs    Hospital Course:  Noemí Elias is a 23 y.o. now , POD #3 who was admitted on 3/30/2019 at 39w2d for labor. On initial assessment, vital signs were stable and physical exam was normal. Infant was in cephalic presentation. Patient was subsequently admitted to labor and delivery unit with signed consents. Labor course complicated by non reassuring FHTs despite resuscitative efforts with minimal cervical change. Decision made to proceed to LTCS, see delivery note for details. Patient delivered a single viable  female via LTCS. Please see delivery note for further details. Pt was in stable condition post delivery and was transferred to the Mother-Baby Unit. Her postpartum course was uncomplicated. On discharge day, patient's pain is controlled with oral pain medications. Pt is tolerating ambulation without SOB or CP, and PO diet without N/V. Reports lochia is mild. Denies any HA, vision changes, F/C, LE swelling. Pt in stable condition and ready for discharge. Discharge instructions and precautions reviewed.    Pertinent studies:  Postpartum CBC  Lab Results   Component Value Date    WBC 13.49 (H) 2019    HGB 8.3 (L) 2019    HCT 25.1 (L) 2019    MCV 86 2019     2019       Delivery:    Episiotomy: None   Lacerations: None   Repair suture: None   Repair # of packets:     Blood loss (ml): 0     Birth information:  YOB: 2020   Time of birth: 6:25 AM   Sex: female   Delivery type: , Low  Transverse   Gestational Age: 39w2d    Delivery Clinician:      Other providers:       Additional  information:  Forceps:    Vacuum:    Breech:    Observed anomalies      Living?:           APGARS  One minute Five minutes Ten minutes   Skin color:         Heart rate:         Grimace:         Muscle tone:         Breathing:         Totals: 5  5  8      Placenta: Delivered:       appearance      Patient Instructions:   Current Discharge Medication List      START taking these medications    Details   HYDROcodone-acetaminophen (NORCO) 5-325 mg per tablet Take 1 tablet by mouth every 4 (four) hours as needed.  Qty: 20 tablet, Refills: 0      ibuprofen (ADVIL,MOTRIN) 600 MG tablet Take 1 tablet (600 mg total) by mouth every 6 (six) hours.  Qty: 30 tablet, Refills: 1      iron polysaccharides (NIFEREX) 150 mg iron Cap Take 1 capsule (150 mg total) by mouth once daily.  Refills: 0         CONTINUE these medications which have NOT CHANGED    Details   prenatal vit 10-iron-folic-dha (VITAFOL-OB+DHA) 65-1-250 mg Cmpk Take 1 tablet by mouth once daily.  Qty: 30 each, Refills: 7      promethazine (PHENERGAN) 25 MG tablet Take 1 tablet (25 mg total) by mouth every 4 (four) hours.  Qty: 15 tablet, Refills: 0             Discharge Procedure Orders   Other restrictions (specify):   Order Comments: Pelvic rest until cleared by MD. Nothing in vagina till cleared by MD including tampons, douching, intercourse.     No driving until not taking narcotic medications     Notify your health care provider if you experience any of the following:  temperature >100.4     Notify your health care provider if you experience any of the following:  severe uncontrolled pain     Notify your health care provider if you experience any of the following:  persistent nausea and vomiting or diarrhea     Notify your health care provider if you experience any of the following:  redness, tenderness, or signs of infection (pain, swelling, redness, odor or  green/yellow discharge around incision site)     Notify your health care provider if you experience any of the following:  difficulty breathing or increased cough     Notify your health care provider if you experience any of the following:  severe persistent headache     Notify your health care provider if you experience any of the following:  worsening rash     Notify your health care provider if you experience any of the following:  persistent dizziness, light-headedness, or visual disturbances     Notify your health care provider if you experience any of the following:   Order Comments: Bleeding through 2 pad/hr for 2 hours     Notify your health care provider if you experience any of the following:  increased confusion or weakness     Activity as tolerated       Gardenia Ramos M.D.  OBSHELIA  PGY2    Doing well, no complaints, ready for D/C.

## 2019-04-08 ENCOUNTER — TELEPHONE (OUTPATIENT)
Dept: OBSTETRICS AND GYNECOLOGY | Facility: OTHER | Age: 24
End: 2019-04-08

## 2019-04-08 NOTE — TELEPHONE ENCOUNTER
Patient contacted for post-discharge FU. Minimal pain form delivery. Pain controlled with oral pain medications. Pt unaware she had to follow up with OB for wound check and PP check. Office number given. Encouraged to call OB. Reports FU w/ peds last week and second FU on Friday 4/12 for stomach issues.

## 2019-04-24 ENCOUNTER — TELEPHONE (OUTPATIENT)
Dept: OBSTETRICS AND GYNECOLOGY | Facility: CLINIC | Age: 24
End: 2019-04-24

## 2019-04-24 NOTE — TELEPHONE ENCOUNTER
----- Message from Haley Krause sent at 4/24/2019  9:47 AM CDT -----  Contact: Pt   Name of Who is Calling: ANDREW GUTIERREZ [[id number]]      What is the request in detail: Patient is requesting a call from staff to schedule follow up appointment, states her incision is bleeding and would like to know if she has to wait a full 6 weeks before coming in. Please contact to further discuss and advise      Can the clinic reply by [facility]: No      What Number to Call Back if not [facility]: [phone]

## 2019-04-24 NOTE — TELEPHONE ENCOUNTER
----- Message from Haley Krause sent at 4/24/2019  9:44 AM CDT -----  Contact: Pt   Can the clinic reply in [facility]:No       Please refill the medication(s) listed below. Please call the patient when the prescription(s) is ready for  at the phone number [phone]    Medication #1:HYDROcodone-acetaminophen (NORCO) 5-325 mg per tablet    Medication #2:ibuprofen (ADVIL,MOTRIN) 600 MG tablet      Preferred Pharmacy: [address] [place] [id number].  [facility] [phone]

## 2019-05-02 ENCOUNTER — TELEPHONE (OUTPATIENT)
Dept: OBSTETRICS AND GYNECOLOGY | Facility: CLINIC | Age: 24
End: 2019-05-02

## 2019-05-02 DIAGNOSIS — R52 POSTPARTUM PAIN: Primary | ICD-10-CM

## 2019-05-02 NOTE — TELEPHONE ENCOUNTER
----- Message from Teagan Thomas sent at 5/2/2019 10:47 AM CDT -----  Contact: pt  Name of Who is Calling: ANDREW GUTIERREZ [[id number]]      What is the request in detail: pt returning call in regards to ibuprofen (ADVIL,MOTRIN) 600 MG tablet.. Please advise    Can the clinic reply by [facility]: no      What Number [facility]: [phone]

## 2019-05-02 NOTE — TELEPHONE ENCOUNTER
Patient complaining of pain with CSection incision but, declined to be seen in [place]. Patient informed me that she can wait until tomorrow to have prescriptions sent to preferred pharmacy. Will route prescription request to Dr. Jeansonne.    Also, scheduled patient's postpartum appointment for 5/13 at 2:30PM. Patient was satisfied and understood.

## 2019-05-02 NOTE — TELEPHONE ENCOUNTER
----- Message from Luis Schumacher sent at 5/2/2019  3:26 PM CDT -----  Contact: self  Pt asked for a call back. The best phone number to reach [phone].

## 2019-05-03 RX ORDER — HYDROCODONE BITARTRATE AND ACETAMINOPHEN 5; 325 MG/1; MG/1
1 TABLET ORAL EVERY 4 HOURS PRN
Qty: 20 TABLET | Refills: 0 | OUTPATIENT
Start: 2019-05-03

## 2019-05-03 RX ORDER — IBUPROFEN 600 MG/1
600 TABLET ORAL EVERY 6 HOURS PRN
Qty: 30 TABLET | Refills: 0 | Status: SHIPPED | OUTPATIENT
Start: 2019-05-03 | End: 2020-05-02

## 2019-05-03 RX ORDER — IBUPROFEN 600 MG/1
600 TABLET ORAL EVERY 6 HOURS
Qty: 30 TABLET | Refills: 1 | OUTPATIENT
Start: 2019-05-03

## 2019-05-15 ENCOUNTER — TELEPHONE (OUTPATIENT)
Dept: OBSTETRICS AND GYNECOLOGY | Facility: CLINIC | Age: 24
End: 2019-05-15

## 2019-05-15 NOTE — TELEPHONE ENCOUNTER
----- Message from Liz Selby sent at 5/15/2019 12:40 PM CDT -----  Contact: Self   Type:  Patient Returning Call    Who Called:  ANDREW GUTIERREZ [[id number]]    Who Left Message for Patient: Maria Del Rosario Leslie     Does the patient know what this is regarding?: Yes, Rx    Best Call Back Number: [phone]    Additional Information:

## 2021-04-28 ENCOUNTER — PATIENT MESSAGE (OUTPATIENT)
Dept: RESEARCH | Facility: HOSPITAL | Age: 26
End: 2021-04-28

## 2021-12-03 ENCOUNTER — OFFICE VISIT (OUTPATIENT)
Dept: OBSTETRICS AND GYNECOLOGY | Facility: CLINIC | Age: 26
End: 2021-12-03
Payer: MEDICAID

## 2021-12-03 ENCOUNTER — TELEPHONE (OUTPATIENT)
Dept: OBSTETRICS AND GYNECOLOGY | Facility: CLINIC | Age: 26
End: 2021-12-03

## 2021-12-03 VITALS
WEIGHT: 220.38 LBS | SYSTOLIC BLOOD PRESSURE: 114 MMHG | BODY MASS INDEX: 36.72 KG/M2 | HEIGHT: 65 IN | DIASTOLIC BLOOD PRESSURE: 58 MMHG

## 2021-12-03 DIAGNOSIS — Z32.01 POSITIVE PREGNANCY TEST: Primary | ICD-10-CM

## 2021-12-03 DIAGNOSIS — Z12.4 ENCOUNTER FOR PAPANICOLAOU SMEAR FOR CERVICAL CANCER SCREENING: ICD-10-CM

## 2021-12-03 LAB
B-HCG UR QL: POSITIVE
CTP QC/QA: YES

## 2021-12-03 PROCEDURE — 87491 CHLMYD TRACH DNA AMP PROBE: CPT | Performed by: REGISTERED NURSE

## 2021-12-03 PROCEDURE — 99205 PR OFFICE/OUTPT VISIT, NEW, LEVL V, 60-74 MIN: ICD-10-PCS | Mod: S$PBB,TH,, | Performed by: REGISTERED NURSE

## 2021-12-03 PROCEDURE — 87591 N.GONORRHOEAE DNA AMP PROB: CPT | Performed by: REGISTERED NURSE

## 2021-12-03 PROCEDURE — 99999 PR PBB SHADOW E&M-EST. PATIENT-LVL III: CPT | Mod: PBBFAC,,, | Performed by: REGISTERED NURSE

## 2021-12-03 PROCEDURE — 81025 URINE PREGNANCY TEST: CPT | Mod: PBBFAC | Performed by: REGISTERED NURSE

## 2021-12-03 PROCEDURE — 99213 OFFICE O/P EST LOW 20 MIN: CPT | Mod: PBBFAC,TH | Performed by: REGISTERED NURSE

## 2021-12-03 PROCEDURE — 99205 OFFICE O/P NEW HI 60 MIN: CPT | Mod: S$PBB,TH,, | Performed by: REGISTERED NURSE

## 2021-12-03 PROCEDURE — 99999 PR PBB SHADOW E&M-EST. PATIENT-LVL III: ICD-10-PCS | Mod: PBBFAC,,, | Performed by: REGISTERED NURSE

## 2021-12-03 PROCEDURE — 88175 CYTOPATH C/V AUTO FLUID REDO: CPT | Performed by: REGISTERED NURSE

## 2021-12-03 PROCEDURE — 87086 URINE CULTURE/COLONY COUNT: CPT | Performed by: REGISTERED NURSE

## 2021-12-05 LAB — BACTERIA UR CULT: NO GROWTH

## 2021-12-06 ENCOUNTER — PROCEDURE VISIT (OUTPATIENT)
Dept: MATERNAL FETAL MEDICINE | Facility: CLINIC | Age: 26
End: 2021-12-06
Payer: MEDICAID

## 2021-12-06 DIAGNOSIS — Z32.01 POSITIVE PREGNANCY TEST: ICD-10-CM

## 2021-12-06 PROCEDURE — 76816 US OB/GYN PROCEDURE (VIEWPOINT): ICD-10-PCS | Mod: 26,S$PBB,, | Performed by: OBSTETRICS & GYNECOLOGY

## 2021-12-06 PROCEDURE — 76816 OB US FOLLOW-UP PER FETUS: CPT | Mod: PBBFAC | Performed by: OBSTETRICS & GYNECOLOGY

## 2021-12-07 LAB
C TRACH DNA SPEC QL NAA+PROBE: NOT DETECTED
N GONORRHOEA DNA SPEC QL NAA+PROBE: NOT DETECTED

## 2021-12-10 LAB
FINAL PATHOLOGIC DIAGNOSIS: NORMAL
Lab: NORMAL

## 2022-01-10 ENCOUNTER — LAB VISIT (OUTPATIENT)
Dept: LAB | Facility: OTHER | Age: 27
End: 2022-01-10
Attending: NURSE PRACTITIONER
Payer: MEDICAID

## 2022-01-10 DIAGNOSIS — Z32.01 POSITIVE PREGNANCY TEST: ICD-10-CM

## 2022-01-10 LAB
ABO + RH BLD: NORMAL
BASOPHILS # BLD AUTO: 0.01 K/UL (ref 0–0.2)
BASOPHILS NFR BLD: 0.1 % (ref 0–1.9)
BLD GP AB SCN CELLS X3 SERPL QL: NORMAL
DIFFERENTIAL METHOD: ABNORMAL
EOSINOPHIL # BLD AUTO: 0.1 K/UL (ref 0–0.5)
EOSINOPHIL NFR BLD: 0.7 % (ref 0–8)
ERYTHROCYTE [DISTWIDTH] IN BLOOD BY AUTOMATED COUNT: 13.7 % (ref 11.5–14.5)
HCT VFR BLD AUTO: 31.4 % (ref 37–48.5)
HGB BLD-MCNC: 10.4 G/DL (ref 12–16)
IMM GRANULOCYTES # BLD AUTO: 0.07 K/UL (ref 0–0.04)
IMM GRANULOCYTES NFR BLD AUTO: 0.9 % (ref 0–0.5)
LYMPHOCYTES # BLD AUTO: 1.5 K/UL (ref 1–4.8)
LYMPHOCYTES NFR BLD: 19.3 % (ref 18–48)
MCH RBC QN AUTO: 29.8 PG (ref 27–31)
MCHC RBC AUTO-ENTMCNC: 33.1 G/DL (ref 32–36)
MCV RBC AUTO: 90 FL (ref 82–98)
MONOCYTES # BLD AUTO: 0.6 K/UL (ref 0.3–1)
MONOCYTES NFR BLD: 8.4 % (ref 4–15)
NEUTROPHILS # BLD AUTO: 5.4 K/UL (ref 1.8–7.7)
NEUTROPHILS NFR BLD: 70.6 % (ref 38–73)
NRBC BLD-RTO: 0 /100 WBC
PLATELET # BLD AUTO: 329 K/UL (ref 150–450)
PMV BLD AUTO: 9.7 FL (ref 9.2–12.9)
RBC # BLD AUTO: 3.49 M/UL (ref 4–5.4)
WBC # BLD AUTO: 7.62 K/UL (ref 3.9–12.7)

## 2022-01-10 PROCEDURE — 86803 HEPATITIS C AB TEST: CPT | Performed by: REGISTERED NURSE

## 2022-01-10 PROCEDURE — 86850 RBC ANTIBODY SCREEN: CPT | Performed by: REGISTERED NURSE

## 2022-01-10 PROCEDURE — 87340 HEPATITIS B SURFACE AG IA: CPT | Performed by: REGISTERED NURSE

## 2022-01-10 PROCEDURE — 86592 SYPHILIS TEST NON-TREP QUAL: CPT | Performed by: REGISTERED NURSE

## 2022-01-10 PROCEDURE — 86762 RUBELLA ANTIBODY: CPT | Performed by: REGISTERED NURSE

## 2022-01-10 PROCEDURE — 87389 HIV-1 AG W/HIV-1&-2 AB AG IA: CPT | Performed by: REGISTERED NURSE

## 2022-01-10 PROCEDURE — 85025 COMPLETE CBC W/AUTO DIFF WBC: CPT | Performed by: REGISTERED NURSE

## 2022-01-10 NOTE — TELEPHONE ENCOUNTER
----- Message from Audra Braga sent at 1/10/2022  3:30 PM CST -----  Patient has an up coming  IOB appt with Dr. Wu on 02/09 and woelli like to know if she can get prenatals med's sent in to [facility]'s at [address] [place] [address]. Pt can be seen at [phone]

## 2022-01-11 LAB
HBV SURFACE AG SERPL QL IA: NEGATIVE
HCV AB SERPL QL IA: NEGATIVE
HIV 1+2 AB+HIV1 P24 AG SERPL QL IA: NEGATIVE
RPR SER QL: NORMAL
RUBV IGG SER-ACNC: 16.5 IU/ML
RUBV IGG SER-IMP: REACTIVE

## 2022-01-15 ENCOUNTER — PATIENT MESSAGE (OUTPATIENT)
Dept: OBSTETRICS AND GYNECOLOGY | Facility: CLINIC | Age: 27
End: 2022-01-15
Payer: MEDICAID

## 2022-02-09 ENCOUNTER — PATIENT MESSAGE (OUTPATIENT)
Dept: MATERNAL FETAL MEDICINE | Facility: CLINIC | Age: 27
End: 2022-02-09
Payer: MEDICAID

## 2022-02-09 ENCOUNTER — INITIAL PRENATAL (OUTPATIENT)
Dept: OBSTETRICS AND GYNECOLOGY | Facility: CLINIC | Age: 27
End: 2022-02-09
Payer: MEDICAID

## 2022-02-09 VITALS
BODY MASS INDEX: 38.48 KG/M2 | WEIGHT: 231.25 LBS | DIASTOLIC BLOOD PRESSURE: 72 MMHG | SYSTOLIC BLOOD PRESSURE: 116 MMHG

## 2022-02-09 DIAGNOSIS — O34.219 HISTORY OF CESAREAN DELIVERY, CURRENTLY PREGNANT: ICD-10-CM

## 2022-02-09 DIAGNOSIS — Z34.82 ENCOUNTER FOR SUPERVISION OF OTHER NORMAL PREGNANCY IN SECOND TRIMESTER: Primary | ICD-10-CM

## 2022-02-09 DIAGNOSIS — R82.90 ABNORMAL URINALYSIS: ICD-10-CM

## 2022-02-09 DIAGNOSIS — O21.9 NAUSEA AND VOMITING OF PREGNANCY, ANTEPARTUM: ICD-10-CM

## 2022-02-09 DIAGNOSIS — O99.212 OBESITY COMPLICATING PREGNANCY IN SECOND TRIMESTER: ICD-10-CM

## 2022-02-09 DIAGNOSIS — A59.9 TRICHOMONAS VAGINALIS INFECTION: ICD-10-CM

## 2022-02-09 PROCEDURE — 87086 URINE CULTURE/COLONY COUNT: CPT | Performed by: OBSTETRICS & GYNECOLOGY

## 2022-02-09 PROCEDURE — 87481 CANDIDA DNA AMP PROBE: CPT | Mod: 59 | Performed by: OBSTETRICS & GYNECOLOGY

## 2022-02-09 PROCEDURE — 99999 PR PBB SHADOW E&M-EST. PATIENT-LVL II: ICD-10-PCS | Mod: PBBFAC,,, | Performed by: OBSTETRICS & GYNECOLOGY

## 2022-02-09 PROCEDURE — 99213 OFFICE O/P EST LOW 20 MIN: CPT | Mod: TH,S$PBB,, | Performed by: OBSTETRICS & GYNECOLOGY

## 2022-02-09 PROCEDURE — 99999 PR PBB SHADOW E&M-EST. PATIENT-LVL II: CPT | Mod: PBBFAC,,, | Performed by: OBSTETRICS & GYNECOLOGY

## 2022-02-09 PROCEDURE — 87801 DETECT AGNT MULT DNA AMPLI: CPT | Performed by: OBSTETRICS & GYNECOLOGY

## 2022-02-09 PROCEDURE — 99212 OFFICE O/P EST SF 10 MIN: CPT | Mod: PBBFAC,TH | Performed by: OBSTETRICS & GYNECOLOGY

## 2022-02-09 PROCEDURE — 99213 PR OFFICE/OUTPT VISIT, EST, LEVL III, 20-29 MIN: ICD-10-PCS | Mod: TH,S$PBB,, | Performed by: OBSTETRICS & GYNECOLOGY

## 2022-02-09 RX ORDER — PROMETHAZINE HYDROCHLORIDE 25 MG/1
25 TABLET ORAL EVERY 6 HOURS PRN
Qty: 30 TABLET | Refills: 1 | Status: SHIPPED | OUTPATIENT
Start: 2022-02-09 | End: 2022-03-02 | Stop reason: SDUPTHER

## 2022-02-09 RX ORDER — METRONIDAZOLE 500 MG/1
500 TABLET ORAL 2 TIMES DAILY
Qty: 14 TABLET | Refills: 0 | Status: SHIPPED | OUTPATIENT
Start: 2022-02-09 | End: 2022-02-16

## 2022-02-09 NOTE — PROGRESS NOTES
OB CHECK  2022    Chief Complaint   Patient presents with    Initial Prenatal Visit        Noemí Elias is a 26 y.o.  at 27w0d by 17+5 week US with an TOAN (estimated date of confinement) Estimated Date of Delivery: 22. She presents today for routine OB Check. Her pregnancy has been complicated by obesity, history of  section, trichomonas on pap smear, nausea and vomiting of pregnancy.     She reports nausea and vomiting most days. Has not tried any medications. She reports positive positive fetal movement. She denies vaginal bleeding, loss of fluids, or contractions. Reports some mild vaginal discharge.     No headache, chest pain, shortness of breath, nausea, vomiting, constipation, diarrhea, urinary urgency, dysuria, anxiety, depression, fever, chills, leg pain, or lower extremity swelling.    Objective:  Vitals:    22 1452   BP: 116/72       GENERAL: Alert and Oriented, No acute distress  Fundal height:28 cm. FHR: 138 bpm  :  Vulva: Inspection of her external genitalia reveals normal mons pubis, labia minora and labia majora.  Normal appearing clitoris, urethral meatus and Hayfield's glands.    Bladder:  No evidence of urethral or bladder tenderness.    Vagina:  Speculum exam reveals pink and moist vaginal mucosa.    Cervix:  Cervix is normal in appearance with no lesions.    Perineum:  Perineum appears normal.  Anus:  Normal with no apparent lesions.      Assessment/Plan:  Noemí Elias is a 26 y.o.  at 27w0d by 17+5 week US with an TOAN (estimated date of confinement) Estimated Date of Delivery: 22. She presents today for routine OB Check. Her pregnancy has been complicated by obesity, history of  section, trichomonas on pap smear, nausea and vomiting of pregnancy.  She is doing well. Vital signs stable.    1. Prenatal care-  labor and bleeding precautions reviewed.  1. Prenatal labs: Reviewed as normal aside from mild anemia with Hgb  10.4.  2. Continue prenatal vitamin  3. Genetic Screen: Declined. Too late for genetic screening.  4. Anatomy US: ordered on 22  5. ? infant, ? feeding, ?circ?  6. PPBC: Desires:   7. 28 week labs: ordered on 22  8. Tdap  9. Flu  10. GBS   11. Pediatrician  12. Labor and transfusion consents    History of  section: Discussed delivery mode at upcoming visit.    Trichomonas diagnosed on pap smear: Reviewed this patient patient today. She was appropriately upset. Vaginosis swab collected to confirm trich seen on pap smear. Treat with flagyl 500 mg bid x 7 days.     Obesity: GTT recommended.     Nausea and vomiting of pregnancy: She declines zofran as it give her headaches. Prefers phenergen. Prescribed today. Check CMP.    + nitrite on urine dip: Urine culture sent    Follow Up: Pt to follow up in 3 weeks    Strict labor, bleeding, fetal movement, leaking of fluid precautions reviewed.     Trevor Wu  2022

## 2022-02-10 LAB
BACTERIA UR CULT: NORMAL
BACTERIA UR CULT: NORMAL

## 2022-02-14 ENCOUNTER — TELEPHONE (OUTPATIENT)
Dept: MATERNAL FETAL MEDICINE | Facility: CLINIC | Age: 27
End: 2022-02-14
Payer: MEDICAID

## 2022-02-14 LAB
BACTERIAL VAGINOSIS DNA: POSITIVE
CANDIDA GLABRATA DNA: NEGATIVE
CANDIDA KRUSEI DNA: NEGATIVE
CANDIDA RRNA VAG QL PROBE: NEGATIVE
T VAGINALIS RRNA GENITAL QL PROBE: POSITIVE

## 2022-02-14 NOTE — TELEPHONE ENCOUNTER
Called for patient, mother answered and stated she was not available. Left call back number for patient to call and schedule ultrasound.

## 2022-02-15 ENCOUNTER — TELEPHONE (OUTPATIENT)
Dept: MATERNAL FETAL MEDICINE | Facility: CLINIC | Age: 27
End: 2022-02-15
Payer: MEDICAID

## 2022-02-15 ENCOUNTER — TELEPHONE (OUTPATIENT)
Dept: OBSTETRICS AND GYNECOLOGY | Facility: CLINIC | Age: 27
End: 2022-02-15
Payer: MEDICAID

## 2022-02-15 NOTE — TELEPHONE ENCOUNTER
Pt u/s is scheduled for 3/2/22    ----- Message from Salome Babb sent at 2/15/2022  1:12 PM CST -----  Regarding: self  .Type:  Patient Returning Call    Who Called: self     Who Left Message for Patient: prosper     Does the patient know what this is regarding?: U/S     Would the patient rather a call back or a response via [facility]? Call     Best Call Back Number: .[phone]     3/2/22 would like to schedule for the U/S for the same day

## 2022-02-15 NOTE — TELEPHONE ENCOUNTER
----- Message from Kristie Carpenter sent at 2/15/2022 12:23 PM CST -----  Regarding: Requesting call back  Name of Who is Calling:  ANDREW GUTIERREZ [[id number]]        What is the request in detail: Requesting call back tp schedule appt.Pt states she wants the appt to be on 3/2 because she already has an appt for that day           Can the clinic reply by [facility]: no           What Number [facility] Call Back [facility]:[phone]

## 2022-02-15 NOTE — TELEPHONE ENCOUNTER
Attempted to contact patient to confirm appointment. No answer and no voicemail. Did make her ultrasound appointment the same day as her Ob appointment as requested.

## 2022-02-16 ENCOUNTER — PATIENT MESSAGE (OUTPATIENT)
Dept: ADMINISTRATIVE | Facility: OTHER | Age: 27
End: 2022-02-16
Payer: MEDICAID

## 2022-02-28 ENCOUNTER — PATIENT MESSAGE (OUTPATIENT)
Dept: MATERNAL FETAL MEDICINE | Facility: CLINIC | Age: 27
End: 2022-02-28
Payer: MEDICAID

## 2022-03-02 ENCOUNTER — PROCEDURE VISIT (OUTPATIENT)
Dept: MATERNAL FETAL MEDICINE | Facility: CLINIC | Age: 27
End: 2022-03-02
Payer: MEDICAID

## 2022-03-02 ENCOUNTER — ROUTINE PRENATAL (OUTPATIENT)
Dept: OBSTETRICS AND GYNECOLOGY | Facility: CLINIC | Age: 27
End: 2022-03-02
Payer: MEDICAID

## 2022-03-02 VITALS
WEIGHT: 229.25 LBS | DIASTOLIC BLOOD PRESSURE: 70 MMHG | SYSTOLIC BLOOD PRESSURE: 120 MMHG | BODY MASS INDEX: 38.15 KG/M2

## 2022-03-02 DIAGNOSIS — Z34.03 ENCOUNTER FOR SUPERVISION OF NORMAL FIRST PREGNANCY IN THIRD TRIMESTER: Primary | ICD-10-CM

## 2022-03-02 DIAGNOSIS — Z34.82 ENCOUNTER FOR SUPERVISION OF OTHER NORMAL PREGNANCY IN SECOND TRIMESTER: ICD-10-CM

## 2022-03-02 DIAGNOSIS — Z36.2 ENCOUNTER FOR FOLLOW-UP ULTRASOUND OF FETAL ANATOMY: Primary | ICD-10-CM

## 2022-03-02 DIAGNOSIS — O34.219 HISTORY OF CESAREAN DELIVERY, CURRENTLY PREGNANT: ICD-10-CM

## 2022-03-02 PROCEDURE — 99213 PR OFFICE/OUTPT VISIT, EST, LEVL III, 20-29 MIN: ICD-10-PCS | Mod: TH,S$PBB,, | Performed by: OBSTETRICS & GYNECOLOGY

## 2022-03-02 PROCEDURE — 99999 PR PBB SHADOW E&M-EST. PATIENT-LVL II: CPT | Mod: PBBFAC,,, | Performed by: OBSTETRICS & GYNECOLOGY

## 2022-03-02 PROCEDURE — 76811 OB US DETAILED SNGL FETUS: CPT | Mod: PBBFAC | Performed by: OBSTETRICS & GYNECOLOGY

## 2022-03-02 PROCEDURE — 99213 OFFICE O/P EST LOW 20 MIN: CPT | Mod: TH,S$PBB,, | Performed by: OBSTETRICS & GYNECOLOGY

## 2022-03-02 PROCEDURE — 76811 US MFM PROCEDURE (VIEWPOINT): ICD-10-PCS | Mod: 26,S$PBB,, | Performed by: OBSTETRICS & GYNECOLOGY

## 2022-03-02 PROCEDURE — 99212 OFFICE O/P EST SF 10 MIN: CPT | Mod: PBBFAC,TH,25 | Performed by: OBSTETRICS & GYNECOLOGY

## 2022-03-02 PROCEDURE — 99999 PR PBB SHADOW E&M-EST. PATIENT-LVL II: ICD-10-PCS | Mod: PBBFAC,,, | Performed by: OBSTETRICS & GYNECOLOGY

## 2022-03-02 RX ORDER — PROMETHAZINE HYDROCHLORIDE 25 MG/1
25 TABLET ORAL EVERY 6 HOURS PRN
Qty: 30 TABLET | Refills: 1 | Status: SHIPPED | OUTPATIENT
Start: 2022-03-02

## 2022-03-02 NOTE — PROGRESS NOTES
OB CHECK  3/2/2022    Chief Complaint   Patient presents with    Routine Prenatal Visit        Noemí Elias is a 26 y.o.  at 30w0d by 17+5 week US with an TOAN (estimated date of confinement) Estimated Date of Delivery: 22. She presents today for routine OB Check. Her pregnancy has been complicated by obesity, history of  section, trichomonas on pap smear, nausea and vomiting of pregnancy.     She reports nausea and vomiting most days. Has not tried any medications. Wasn't able to get phenergen. She reports positive positive fetal movement. She denies vaginal bleeding, loss of fluids, or contractions. She took flagyl for trich infection. Has not had sex since then.    No headache, chest pain, shortness of breath, nausea, vomiting, constipation, diarrhea, urinary urgency, dysuria, anxiety, depression, fever, chills, leg pain, or lower extremity swelling.    Objective:  Vitals:    22 1344   BP: 120/70       GENERAL: Alert and Oriented, No acute distress  Fundal height: 30.5 cm. FHR: 140 bpm    Assessment/Plan:  Noemí Elias is a 26 y.o.  at 30w0d by 17+5 week US with an TOAN (estimated date of confinement) Estimated Date of Delivery: 22. She presents today for routine OB Check. Her pregnancy has been complicated by obesity, history of  section, trichomonas on pap smear, nausea and vomiting of pregnancy.  She is doing well. Vital signs stable.    1. Prenatal care-  labor and bleeding precautions reviewed.  1. Prenatal labs: Reviewed as normal aside from mild anemia with Hgb 10.4.  2. Continue prenatal vitamin  3. Genetic Screen: Declined. Too late for genetic screening.  4. Anatomy US: ordered on 22  5. ? infant, ? feeding, ?circ?  6. PPBC: Desires:   7. 28 week labs: ordered on 22, encouraged her to get these  8. Tdap: she accepts. Order placed.  9. Covid vaccine: She declines.   10. GBS   11. Pediatrician  12. Labor and transfusion  consents    History of  section: Discussed delivery options on 3/2/22. Will continue to discuss.     Trichomonas diagnosed on pap smear: s/p flagyl 500 mg bid x 7 days. Test of cure at next visit.     Obesity: GTT recommended.     Nausea and vomiting of pregnancy: She declines zofran as it give her headaches. Prefers phenergen. Prescribed again today.     + nitrite on urine dip: Urine culture resent as it was previously contaminated.     Follow Up: Pt to follow up in 2 weeks    Strict labor, bleeding, fetal movement, leaking of fluid precautions reviewed.     Trevor Wu  3/2/2022

## 2022-03-29 ENCOUNTER — PATIENT MESSAGE (OUTPATIENT)
Dept: MATERNAL FETAL MEDICINE | Facility: CLINIC | Age: 27
End: 2022-03-29
Payer: MEDICAID

## 2022-03-30 ENCOUNTER — PROCEDURE VISIT (OUTPATIENT)
Dept: MATERNAL FETAL MEDICINE | Facility: CLINIC | Age: 27
End: 2022-03-30
Payer: MEDICAID

## 2022-03-30 DIAGNOSIS — Z36.2 ENCOUNTER FOR FOLLOW-UP ULTRASOUND OF FETAL ANATOMY: ICD-10-CM

## 2022-03-30 PROCEDURE — 76816 OB US FOLLOW-UP PER FETUS: CPT | Mod: PBBFAC | Performed by: OBSTETRICS & GYNECOLOGY

## 2022-03-30 PROCEDURE — 76816 US MFM PROCEDURE (VIEWPOINT): ICD-10-PCS | Mod: 26,S$PBB,, | Performed by: OBSTETRICS & GYNECOLOGY

## 2022-04-13 ENCOUNTER — TELEPHONE (OUTPATIENT)
Dept: OBSTETRICS AND GYNECOLOGY | Facility: CLINIC | Age: 27
End: 2022-04-13
Payer: MEDICAID

## 2022-04-13 NOTE — TELEPHONE ENCOUNTER
Called to review u/s results. Pt mother answered and replied that she would have pt call office.

## 2022-04-21 ENCOUNTER — ROUTINE PRENATAL (OUTPATIENT)
Dept: OBSTETRICS AND GYNECOLOGY | Facility: CLINIC | Age: 27
End: 2022-04-21
Payer: MEDICAID

## 2022-04-21 VITALS
WEIGHT: 237.44 LBS | BODY MASS INDEX: 39.51 KG/M2 | DIASTOLIC BLOOD PRESSURE: 70 MMHG | SYSTOLIC BLOOD PRESSURE: 108 MMHG

## 2022-04-21 DIAGNOSIS — Z34.83 ENCOUNTER FOR SUPERVISION OF OTHER NORMAL PREGNANCY IN THIRD TRIMESTER: Primary | ICD-10-CM

## 2022-04-21 DIAGNOSIS — Z11.3 SCREEN FOR STD (SEXUALLY TRANSMITTED DISEASE): ICD-10-CM

## 2022-04-21 DIAGNOSIS — O34.219 HISTORY OF CESAREAN DELIVERY, CURRENTLY PREGNANT: ICD-10-CM

## 2022-04-21 PROCEDURE — 99999 PR PBB SHADOW E&M-EST. PATIENT-LVL III: ICD-10-PCS | Mod: PBBFAC,,, | Performed by: OBSTETRICS & GYNECOLOGY

## 2022-04-21 PROCEDURE — 87801 DETECT AGNT MULT DNA AMPLI: CPT | Performed by: OBSTETRICS & GYNECOLOGY

## 2022-04-21 PROCEDURE — 99213 PR OFFICE/OUTPT VISIT, EST, LEVL III, 20-29 MIN: ICD-10-PCS | Mod: TH,S$PBB,, | Performed by: OBSTETRICS & GYNECOLOGY

## 2022-04-21 PROCEDURE — 99999 PR PBB SHADOW E&M-EST. PATIENT-LVL III: CPT | Mod: PBBFAC,,, | Performed by: OBSTETRICS & GYNECOLOGY

## 2022-04-21 PROCEDURE — 87591 N.GONORRHOEAE DNA AMP PROB: CPT | Mod: 59 | Performed by: OBSTETRICS & GYNECOLOGY

## 2022-04-21 PROCEDURE — 87491 CHLMYD TRACH DNA AMP PROBE: CPT | Mod: 59 | Performed by: OBSTETRICS & GYNECOLOGY

## 2022-04-21 PROCEDURE — 87081 CULTURE SCREEN ONLY: CPT | Performed by: OBSTETRICS & GYNECOLOGY

## 2022-04-21 PROCEDURE — 87481 CANDIDA DNA AMP PROBE: CPT | Mod: 59 | Performed by: OBSTETRICS & GYNECOLOGY

## 2022-04-21 PROCEDURE — 99213 OFFICE O/P EST LOW 20 MIN: CPT | Mod: PBBFAC,TH | Performed by: OBSTETRICS & GYNECOLOGY

## 2022-04-21 PROCEDURE — 99213 OFFICE O/P EST LOW 20 MIN: CPT | Mod: TH,S$PBB,, | Performed by: OBSTETRICS & GYNECOLOGY

## 2022-04-21 RX ORDER — SODIUM CHLORIDE 9 MG/ML
INJECTION, SOLUTION INTRAVENOUS CONTINUOUS
Status: CANCELLED | OUTPATIENT
Start: 2022-04-21

## 2022-04-21 RX ORDER — MUPIROCIN 20 MG/G
OINTMENT TOPICAL
Status: CANCELLED | OUTPATIENT
Start: 2022-04-21

## 2022-04-22 LAB
BACTERIAL VAGINOSIS DNA: NEGATIVE
CANDIDA GLABRATA DNA: POSITIVE
CANDIDA KRUSEI DNA: NEGATIVE
CANDIDA RRNA VAG QL PROBE: POSITIVE
T VAGINALIS RRNA GENITAL QL PROBE: POSITIVE

## 2022-04-22 RX ORDER — FLUCONAZOLE 150 MG/1
150 TABLET ORAL DAILY
Qty: 1 TABLET | Refills: 0 | Status: SHIPPED | OUTPATIENT
Start: 2022-04-22 | End: 2022-04-23

## 2022-04-22 RX ORDER — METRONIDAZOLE 500 MG/1
500 TABLET ORAL 2 TIMES DAILY
Qty: 14 TABLET | Refills: 0 | Status: SHIPPED | OUTPATIENT
Start: 2022-04-22 | End: 2022-04-29

## 2022-04-22 NOTE — H&P
H&P/OB CHECK  2022    Chief Complaint   Patient presents with    Routine Prenatal Visit        Noemí Elias is a 26 y.o.  at 37w1d by 17+5 week US with an TOAN (estimated date of confinement) Estimated Date of Delivery: 22. She presents today for routine OB Check. Her pregnancy has been complicated by obesity, history of  section, trichomonas on pap smear, nausea and vomiting of pregnancy.     She reports positive positive fetal movement. She denies vaginal bleeding, loss of fluids, or contractions. She took flagyl for trich infection. Has not had sex since then.    No headache, chest pain, shortness of breath, nausea, vomiting, constipation, diarrhea, urinary urgency, dysuria, anxiety, depression, fever, chills, leg pain, or lower extremity swelling.    History reviewed. No pertinent past medical history.  Past Surgical History:   Procedure Laterality Date     SECTION N/A 3/30/2019    Procedure:  SECTION;  Surgeon: Flor Kuhn MD;  [facility];  Service: OB/GYN;  Laterality: N/A;     Current Outpatient Medications on File Prior to Visit   Medication Sig Dispense Refill    prenatal vit 10-iron-folic-dha (VITAFOL-OB+DHA) 65-1-250 mg Cmpk Take 1 tablet by mouth once daily. 30 each 7    promethazine (PHENERGAN) 25 MG tablet Take 1 tablet (25 mg total) by mouth every 6 (six) hours as needed for Nausea. 30 tablet 1     No current facility-administered medications on file prior to visit.     Review of patient's allergies indicates:   Allergen Reactions    Zithromax [azithromycin]      Social History     Socioeconomic History    Marital status: Single   Tobacco Use    Smoking status: Former Smoker    Smokeless tobacco: Former User   Substance and Sexual Activity    Alcohol use: No    Drug use: Yes     Types: Marijuana    Sexual activity: Yes     Partners: Male     Family History   Problem Relation Age of Onset    Breast cancer Mother     Colon cancer  Neg Hx     Ovarian cancer Neg Hx      OB:     Objective:  Vitals:    22 1318   BP: 108/70       GENERAL: Alert and Oriented, No acute distress  Fundal height: 37 cm. FHR: 150 bpm      Assessment/Plan:  Noemí Elias is a 26 y.o.  at 37w1d by 17+5 week US with an TOAN (estimated date of confinement) Estimated Date of Delivery: 22. She presents today for routine OB Check. Her pregnancy has been complicated by obesity, history of  section, trichomonas on pap smear, nausea and vomiting of pregnancy.  She is doing well. Vital signs stable.    1. Prenatal care-  labor and bleeding precautions reviewed.  1. Prenatal labs: Reviewed as normal aside from mild anemia with Hgb 10.4.  2. Continue prenatal vitamin  3. Genetic Screen: Declined. Too late for genetic screening.  4. Anatomy US: normal but limited on 3/2/22. Normal but still limited on 3/31/22. Normal growth.  5. Male infant, ? feeding, Desires circumcision  6. PPBC: Desires: Likely Larc but still deciding.  7. 28 week labs: ordered on 22, encouraged her to get these  8. Tdap: she accepted but didn't get this  9. Covid vaccine: She declines.   10. GBS collected on 22  11. Pediatrician: She has one  12. Labor and transfusion consents Reviewed and signed on 22.     History of  section: Discussed risks and benefits. She declines TOLAC. Consent form signed on 22. Will try to schedule this around 39 weeks gestation on either 22 or 22.     Trichomonas diagnosed on pap smear: s/p flagyl 500 mg bid x 7 days. Vaginosis/GC/CT collected on 22    Obesity: GTT recommended.       Follow Up: Pt to follow up in 1 week for OB check    Strict labor, bleeding, fetal movement, leaking of fluid precautions reviewed.     Trevor Wu  2022

## 2022-04-22 NOTE — H&P (VIEW-ONLY)
H&P/OB CHECK  2022    Chief Complaint   Patient presents with    Routine Prenatal Visit        Noemí Elias is a 26 y.o.  at 37w1d by 17+5 week US with an TOAN (estimated date of confinement) Estimated Date of Delivery: 22. She presents today for routine OB Check. Her pregnancy has been complicated by obesity, history of  section, trichomonas on pap smear, nausea and vomiting of pregnancy.     She reports positive positive fetal movement. She denies vaginal bleeding, loss of fluids, or contractions. She took flagyl for trich infection. Has not had sex since then.    No headache, chest pain, shortness of breath, nausea, vomiting, constipation, diarrhea, urinary urgency, dysuria, anxiety, depression, fever, chills, leg pain, or lower extremity swelling.    History reviewed. No pertinent past medical history.  Past Surgical History:   Procedure Laterality Date     SECTION N/A 3/30/2019    Procedure:  SECTION;  Surgeon: Flor Kuhn MD;  [facility]: [facility];  Service: OB/GYN;  Laterality: N/A;     Current Outpatient Medications on File Prior to Visit   Medication Sig Dispense Refill    prenatal vit 10-iron-folic-dha (VITAFOL-OB+DHA) 65-1-250 mg Cmpk Take 1 tablet by mouth once daily. 30 each 7    promethazine (PHENERGAN) 25 MG tablet Take 1 tablet (25 mg total) by mouth every 6 (six) hours as needed for Nausea. 30 tablet 1     No current facility-administered medications on file prior to visit.     Review of patient's allergies indicates:   Allergen Reactions    Zithromax [azithromycin]      Social History     Socioeconomic History    Marital status: Single   Tobacco Use    Smoking status: Former Smoker    Smokeless tobacco: Former User   Substance and Sexual Activity    Alcohol use: No    Drug use: Yes     Types: Marijuana    Sexual activity: Yes     Partners: Male     Family History   Problem Relation Age of Onset    Breast cancer Mother     Colon cancer  Neg Hx     Ovarian cancer Neg Hx      OB:     Objective:  Vitals:    22 1318   BP: 108/70       GENERAL: Alert and Oriented, No acute distress  Fundal height: 37 cm. FHR: 150 bpm      Assessment/Plan:  Noemí Elias is a 26 y.o.  at 37w1d by 17+5 week US with an TOAN (estimated date of confinement) Estimated Date of Delivery: 22. She presents today for routine OB Check. Her pregnancy has been complicated by obesity, history of  section, trichomonas on pap smear, nausea and vomiting of pregnancy.  She is doing well. Vital signs stable.    1. Prenatal care-  labor and bleeding precautions reviewed.  1. Prenatal labs: Reviewed as normal aside from mild anemia with Hgb 10.4.  2. Continue prenatal vitamin  3. Genetic Screen: Declined. Too late for genetic screening.  4. Anatomy US: normal but limited on 3/2/22. Normal but still limited on 3/31/22. Normal growth.  5. Male infant, ? feeding, Desires circumcision  6. PPBC: Desires: Likely Larc but still deciding.  7. 28 week labs: ordered on 22, encouraged her to get these  8. Tdap: she accepted but didn't get this  9. Covid vaccine: She declines.   10. GBS collected on 22  11. Pediatrician: She has one  12. Labor and transfusion consents Reviewed and signed on 22.     History of  section: Discussed risks and benefits. She declines TOLAC. Consent form signed on 22. Will try to schedule this around 39 weeks gestation on either 22 or 22.     Trichomonas diagnosed on pap smear: s/p flagyl 500 mg bid x 7 days. Vaginosis/GC/CT collected on 22    Obesity: GTT recommended.       Follow Up: Pt to follow up in 1 week for OB check    Strict labor, bleeding, fetal movement, leaking of fluid precautions reviewed.     Trevor Wu  2022

## 2022-04-23 LAB
C TRACH DNA SPEC QL NAA+PROBE: NOT DETECTED
N GONORRHOEA DNA SPEC QL NAA+PROBE: NOT DETECTED

## 2022-04-25 ENCOUNTER — TELEPHONE (OUTPATIENT)
Dept: OBSTETRICS AND GYNECOLOGY | Facility: HOSPITAL | Age: 27
End: 2022-04-25
Payer: MEDICAID

## 2022-04-25 LAB — BACTERIA SPEC AEROBE CULT: NORMAL

## 2022-04-25 NOTE — TELEPHONE ENCOUNTER
Attempted to call patient but her mother answered. I asked her to let Noemí know that her surgery date is 5/4/22 at 1 pm and she should arrive by 11 AM.    I asked her to have Noemí call my office as I have some results to review with her.    Trevor Wu

## 2022-04-26 ENCOUNTER — ROUTINE PRENATAL (OUTPATIENT)
Dept: OBSTETRICS AND GYNECOLOGY | Facility: CLINIC | Age: 27
End: 2022-04-26
Payer: MEDICAID

## 2022-04-26 ENCOUNTER — HOSPITAL ENCOUNTER (EMERGENCY)
Facility: OTHER | Age: 27
Discharge: HOME | End: 2022-04-26
Payer: MEDICAID

## 2022-04-26 VITALS
SYSTOLIC BLOOD PRESSURE: 108 MMHG | DIASTOLIC BLOOD PRESSURE: 70 MMHG | BODY MASS INDEX: 39.58 KG/M2 | WEIGHT: 237.88 LBS

## 2022-04-26 VITALS
DIASTOLIC BLOOD PRESSURE: 59 MMHG | TEMPERATURE: 98 F | SYSTOLIC BLOOD PRESSURE: 96 MMHG | HEART RATE: 81 BPM | RESPIRATION RATE: 17 BRPM | OXYGEN SATURATION: 99 %

## 2022-04-26 DIAGNOSIS — Z34.80 SUPERVISION OF OTHER NORMAL PREGNANCY, ANTEPARTUM: Primary | ICD-10-CM

## 2022-04-26 DIAGNOSIS — Z3A.37 37 WEEKS GESTATION OF PREGNANCY: ICD-10-CM

## 2022-04-26 DIAGNOSIS — O21.9 NAUSEA AND VOMITING DURING PREGNANCY: Primary | ICD-10-CM

## 2022-04-26 LAB
BILIRUB SERPL-MCNC: NORMAL MG/DL
BLOOD URINE, POC: NORMAL
COLOR, POC UA: YELLOW
GLUCOSE UR QL STRIP: NORMAL
KETONES UR QL STRIP: NORMAL
LEUKOCYTE ESTERASE URINE, POC: NORMAL
NITRITE, POC UA: NORMAL
PH, POC UA: 5
PROTEIN, POC: NORMAL
SPECIFIC GRAVITY, POC UA: 1.02
UROBILINOGEN, POC UA: 8

## 2022-04-26 PROCEDURE — 59025 FETAL NON-STRESS TEST: CPT | Mod: 26,,, | Performed by: OBSTETRICS & GYNECOLOGY

## 2022-04-26 PROCEDURE — 99284 EMERGENCY DEPT VISIT MOD MDM: CPT | Mod: 25,27

## 2022-04-26 PROCEDURE — 81002 URINALYSIS NONAUTO W/O SCOPE: CPT

## 2022-04-26 PROCEDURE — 59025 PR FETAL 2N-STRESS TEST: ICD-10-PCS | Mod: 26,,, | Performed by: OBSTETRICS & GYNECOLOGY

## 2022-04-26 PROCEDURE — 99999 PR PBB SHADOW E&M-EST. PATIENT-LVL II: ICD-10-PCS | Mod: PBBFAC,,,

## 2022-04-26 PROCEDURE — 25000003 PHARM REV CODE 250: Performed by: STUDENT IN AN ORGANIZED HEALTH CARE EDUCATION/TRAINING PROGRAM

## 2022-04-26 PROCEDURE — 99212 OFFICE O/P EST SF 10 MIN: CPT | Mod: PBBFAC,TH,25

## 2022-04-26 PROCEDURE — 99999 PR PBB SHADOW E&M-EST. PATIENT-LVL II: CPT | Mod: PBBFAC,,,

## 2022-04-26 PROCEDURE — 99213 PR OFFICE/OUTPT VISIT, EST, LEVL III, 20-29 MIN: ICD-10-PCS | Mod: 25,TH,S$PBB, | Performed by: OBSTETRICS & GYNECOLOGY

## 2022-04-26 PROCEDURE — 99283 PR EMERGENCY DEPT VISIT,LEVEL III: ICD-10-PCS | Mod: 25,,, | Performed by: OBSTETRICS & GYNECOLOGY

## 2022-04-26 PROCEDURE — 99283 EMERGENCY DEPT VISIT LOW MDM: CPT | Mod: 25,,, | Performed by: OBSTETRICS & GYNECOLOGY

## 2022-04-26 PROCEDURE — 59025 FETAL NON-STRESS TEST: CPT

## 2022-04-26 PROCEDURE — 99213 OFFICE O/P EST LOW 20 MIN: CPT | Mod: 25,TH,S$PBB, | Performed by: OBSTETRICS & GYNECOLOGY

## 2022-04-26 RX ORDER — MAG HYDROX/ALUMINUM HYD/SIMETH 200-200-20
30 SUSPENSION, ORAL (FINAL DOSE FORM) ORAL ONCE
Status: COMPLETED | OUTPATIENT
Start: 2022-04-26 | End: 2022-04-26

## 2022-04-26 RX ORDER — MAG HYDROX/ALUMINUM HYD/SIMETH 200-200-20
30 SUSPENSION, ORAL (FINAL DOSE FORM) ORAL
Status: DISCONTINUED | OUTPATIENT
Start: 2022-04-26 | End: 2022-04-26

## 2022-04-26 RX ORDER — FAMOTIDINE 20 MG/1
20 TABLET, FILM COATED ORAL 2 TIMES DAILY
Qty: 60 TABLET | Refills: 11 | Status: SHIPPED | OUTPATIENT
Start: 2022-04-26 | End: 2023-04-26

## 2022-04-26 RX ORDER — FAMOTIDINE 20 MG/1
20 TABLET, FILM COATED ORAL ONCE
Status: COMPLETED | OUTPATIENT
Start: 2022-04-26 | End: 2022-04-26

## 2022-04-26 RX ADMIN — FAMOTIDINE 20 MG: 20 TABLET ORAL at 02:04

## 2022-04-26 RX ADMIN — ALUMINUM HYDROXIDE, MAGNESIUM HYDROXIDE, AND SIMETHICONE 30 ML: 200; 200; 20 SUSPENSION ORAL at 02:04

## 2022-04-26 NOTE — ED PROVIDER NOTES
Encounter Date: 2022       History     Chief Complaint   Patient presents with    Contractions     Noemí Elias is a 26 y.o. [id number] at 37w6d presents complaining of contractions and an episode of hematemesis after throwing up her dinner. She does not complain of nausea now. She does endorse heartburn.    This IUP is complicated by hx CS x1, MO.  Patient reports contractions, denies vaginal bleeding, denies LOF.   Fetal Movement: normal.      Review of patient's allergies indicates:   Allergen Reactions    Zithromax [azithromycin]      No past medical history on file.  Past Surgical History:   Procedure Laterality Date     SECTION N/A 3/30/2019    Procedure:  SECTION;  Surgeon: Flor Kuhn MD;  [facility];  Service: OB/GYN;  Laterality: N/A;     Family History   Problem Relation Age of Onset    Breast cancer Mother     Colon cancer Neg Hx     Ovarian cancer Neg Hx      Social History     Tobacco Use    Smoking status: Former Smoker    Smokeless tobacco: Former User   Substance Use Topics    Alcohol use: No    Drug use: Yes     Types: Marijuana     Review of Systems   Constitutional: Negative for chills and fever.   HENT: Negative for sore throat.    Eyes: Negative for visual disturbance.   Respiratory: Negative for shortness of breath.    Cardiovascular: Negative for chest pain and palpitations.   Gastrointestinal: Negative for diarrhea.   Genitourinary: Negative for difficulty urinating.   Musculoskeletal: Negative for myalgias.   Skin: Negative for color change.   Neurological: Negative for dizziness, light-headedness and headaches.   Psychiatric/Behavioral: Negative for confusion.   All other systems reviewed and are negative.      Physical Exam     Initial Vitals   BP Pulse Resp Temp SpO2   22 0153 22 0152 22 0153 22 0153 22 0152   (!) 96/59 82 17 97.6 °F (36.4 °C) 99 %      MAP       --                Physical Exam    Vitals  reviewed.  Constitutional: She appears well-developed and well-nourished. No distress.   HENT:   Head: Normocephalic and atraumatic.   Eyes: EOM are normal.   Neck:   Normal range of motion.  Cardiovascular: Normal rate.   Pulmonary/Chest: She has no wheezes.   Abdominal: Abdomen is soft. There is no abdominal tenderness.   Musculoskeletal:         General: Normal range of motion.      Cervical back: Normal range of motion.     Neurological: She is alert and oriented to person, place, and time.   Skin: Skin is warm, dry and intact. No rash noted.   Psychiatric: She has a normal mood and affect. Her speech is normal and behavior is normal.         ED Course   Fetal non-stress test    Date/Time: 4/26/2022 2:37 AM  Performed by: Bal Salmon MD  Authorized by: Bal Salmon MD     Nonstress Test:     Variability:  6-25 BPM    Decelerations:  None    Accelerations:  15 bpm    Baseline:  150    Contractions:  Not present  Biophysical Profile:     Nonstress Test Interpretation: reactive      Overall Impression:  Reassuring      Labs Reviewed   POCT URINALYSIS, DIPSTICK OR TABLET REAGENT, AUTOMATED, WITH MICROSCOP          Imaging Results    None          Medications   famotidine tablet 20 mg (20 mg Oral Given 4/26/22 0241)   aluminum-magnesium hydroxide-simethicone 200-200-20 mg/5 mL suspension 30 mL (30 mLs Oral Given 4/26/22 0241)     Medical Decision Making:   ED Management:  VSS  Closed on exam  NST reactive, reassuring w/o contractions  Maalox, pepcid  UA: normal spec gravity  Stable for discharge  Discussed with staff                        Clinical Impression:   Final diagnoses:  [O21.9] Nausea and vomiting during pregnancy (Primary)  [Z3A.37] 37 weeks gestation of pregnancy          ED Disposition Condition    Discharge Stable        ED Prescriptions     Medication Sig Dispense Start Date End Date Auth. Provider    famotidine (PEPCID) 20 MG tablet Take 1 tablet (20 mg total) by mouth 2 (two) times daily. 60  tablet 4/26/2022 4/26/2023 Bal Salmon MD        Follow-up Information    None       KAREN Salmon MD  OBGYN PGY-3      Bal Salmon MD  Resident  04/26/22 0323

## 2022-04-26 NOTE — DISCHARGE INSTRUCTIONS
Call clinic [phone] or L & D after hours [phone] for:   vaginal bleeding,   leaking of fluids,   contractions (4-5 in ONE hour),   decreased fetal movements (10 kicks in 2 hours),   headache not relieved by Tylenol,   blurry vision, or   temp of 100.4 or greater.     Begin doing fetal kick counts, at least 10 movements in 2 hours starting at 28 weeks of gestation.     Keep next clinic appointment (see appointment date and time below).

## 2022-04-26 NOTE — ED TRIAGE NOTES
Pt reports to the [facility] with complaints of contractions and abdominal pain. Patient also states that she recently threw up bright red blood. Patient is unsure of how often contractions are occurring but she states that the pain is pretty constant. Patient denies any vaginal bleeding or leaking of vaginal fluids. Patient denies any falls in the last three months. VSS. Afebrile. +FM. Patient placed in [facility] room 3. MD notified of patient arrival. Bed in lowest position. Call light within reach.

## 2022-04-26 NOTE — PROGRESS NOTES
Reason for visit: Routine Prenatal Visit      HPI:   26 y.o., at 37w6d by Estimated Date of Delivery: 22    In with no c/o    - Contractions: denies  - Bleeding: denies  - Loss of fluid: denies  - Fetal movement: reports good FM, reinforced BId  - Nausea: no  - Vomiting: no  - Headache: no      Reviewed:    Past medical, surgical, social, family, and obstetric history: Reviewed and updated in EMR.  Medications: Reviewed and updated in EMR.  Allergies: Zithromax [azithromycin]    Pregnancy dating, labs, ultrasound reports, prenatal testing, and problem list: Reviewed and updated in EMR.  Outside records: na  Independent interpretation of tests: na  Discussion with another healthcare professional: na      Vitals: /70   Wt 107.9 kg (237 lb 14 oz)   BMI 39.58 kg/m²     Physical exam:  GENERAL: No acute distress  ABD: Gravid      Assessment and Plan:    Supervision of other normal pregnancy, antepartum         Discussed scheduled  delivery on 22.    Reviewed s/s active labor, rom, bleeding and if occur report to L&D      Follow-up:  Scheduled c/s next week      I spent a total of 20 minutes on the day of the visit. This includes face to face time and non-face to face time preparing to see the patient (eg, review of tests), Obtaining and/or reviewing separately obtained history, Documenting clinical information in the electronic or other health record, Independently interpreting results and communicating results to the patient/family/caregiver, or Care coordination.

## 2022-04-26 NOTE — Clinical Note
"Noemí Hainesmarty Elias was seen and treated in our emergency department on 4/26/2022.  She may return to work on 04/27/2022.       If you have any questions or concerns, please don't hesitate to call.      Fortunato Ceron MD RN    "

## 2022-05-04 ENCOUNTER — ANESTHESIA (OUTPATIENT)
Dept: OBSTETRICS AND GYNECOLOGY | Facility: OTHER | Age: 27
End: 2022-05-04
Payer: MEDICAID

## 2022-05-04 ENCOUNTER — ANESTHESIA EVENT (OUTPATIENT)
Dept: OBSTETRICS AND GYNECOLOGY | Facility: OTHER | Age: 27
End: 2022-05-04
Payer: MEDICAID

## 2022-05-04 ENCOUNTER — HOSPITAL ENCOUNTER (INPATIENT)
Facility: OTHER | Age: 27
LOS: 2 days | Discharge: HOME | End: 2022-05-06
Attending: OBSTETRICS & GYNECOLOGY | Admitting: OBSTETRICS & GYNECOLOGY
Payer: MEDICAID

## 2022-05-04 DIAGNOSIS — Z3A.39 39 WEEKS GESTATION OF PREGNANCY: ICD-10-CM

## 2022-05-04 DIAGNOSIS — Z98.891 S/P CESAREAN SECTION: Primary | ICD-10-CM

## 2022-05-04 DIAGNOSIS — O34.219 HISTORY OF CESAREAN DELIVERY, CURRENTLY PREGNANT: ICD-10-CM

## 2022-05-04 DIAGNOSIS — Z34.83 ENCOUNTER FOR SUPERVISION OF OTHER NORMAL PREGNANCY IN THIRD TRIMESTER: ICD-10-CM

## 2022-05-04 LAB
ABO + RH BLD: NORMAL
BASOPHILS # BLD AUTO: 0.01 K/UL (ref 0–0.2)
BASOPHILS NFR BLD: 0.1 % (ref 0–1.9)
BLD GP AB SCN CELLS X3 SERPL QL: NORMAL
DIFFERENTIAL METHOD: ABNORMAL
EOSINOPHIL # BLD AUTO: 0.1 K/UL (ref 0–0.5)
EOSINOPHIL NFR BLD: 0.7 % (ref 0–8)
ERYTHROCYTE [DISTWIDTH] IN BLOOD BY AUTOMATED COUNT: 13.8 % (ref 11.5–14.5)
HCT VFR BLD AUTO: 33.1 % (ref 37–48.5)
HGB BLD-MCNC: 11.3 G/DL (ref 12–16)
HIV 1+2 AB+HIV1 P24 AG SERPL QL IA: NEGATIVE
IMM GRANULOCYTES # BLD AUTO: 0.06 K/UL (ref 0–0.04)
IMM GRANULOCYTES NFR BLD AUTO: 0.8 % (ref 0–0.5)
LYMPHOCYTES # BLD AUTO: 1.9 K/UL (ref 1–4.8)
LYMPHOCYTES NFR BLD: 25.7 % (ref 18–48)
MCH RBC QN AUTO: 30.4 PG (ref 27–31)
MCHC RBC AUTO-ENTMCNC: 34.1 G/DL (ref 32–36)
MCV RBC AUTO: 89 FL (ref 82–98)
MONOCYTES # BLD AUTO: 0.6 K/UL (ref 0.3–1)
MONOCYTES NFR BLD: 8.5 % (ref 4–15)
NEUTROPHILS # BLD AUTO: 4.8 K/UL (ref 1.8–7.7)
NEUTROPHILS NFR BLD: 64.2 % (ref 38–73)
NRBC BLD-RTO: 0 /100 WBC
PLATELET # BLD AUTO: 321 K/UL (ref 150–450)
PMV BLD AUTO: 9.7 FL (ref 9.2–12.9)
RBC # BLD AUTO: 3.72 M/UL (ref 4–5.4)
SARS-COV-2 RDRP RESP QL NAA+PROBE: NEGATIVE
WBC # BLD AUTO: 7.42 K/UL (ref 3.9–12.7)

## 2022-05-04 PROCEDURE — 63600175 PHARM REV CODE 636 W HCPCS: Performed by: OBSTETRICS & GYNECOLOGY

## 2022-05-04 PROCEDURE — 59514 CESAREAN DELIVERY ONLY: CPT | Mod: AA,,, | Performed by: ANESTHESIOLOGY

## 2022-05-04 PROCEDURE — 86592 SYPHILIS TEST NON-TREP QUAL: CPT | Performed by: OBSTETRICS & GYNECOLOGY

## 2022-05-04 PROCEDURE — 36004724 HC OB OR TIME LEV III - 1ST 15 MIN: Performed by: OBSTETRICS & GYNECOLOGY

## 2022-05-04 PROCEDURE — 85025 COMPLETE CBC W/AUTO DIFF WBC: CPT | Performed by: OBSTETRICS & GYNECOLOGY

## 2022-05-04 PROCEDURE — 36004725 HC OB OR TIME LEV III - EA ADD 15 MIN: Performed by: OBSTETRICS & GYNECOLOGY

## 2022-05-04 PROCEDURE — 87389 HIV-1 AG W/HIV-1&-2 AB AG IA: CPT | Performed by: OBSTETRICS & GYNECOLOGY

## 2022-05-04 PROCEDURE — 63600175 PHARM REV CODE 636 W HCPCS: Performed by: STUDENT IN AN ORGANIZED HEALTH CARE EDUCATION/TRAINING PROGRAM

## 2022-05-04 PROCEDURE — 11000001 HC ACUTE MED/SURG PRIVATE ROOM

## 2022-05-04 PROCEDURE — 59514 PRA REAN DELIVERY ONLY: ICD-10-PCS | Mod: AA,,, | Performed by: ANESTHESIOLOGY

## 2022-05-04 PROCEDURE — 37000008 HC ANESTHESIA 1ST 15 MINUTES: Performed by: OBSTETRICS & GYNECOLOGY

## 2022-05-04 PROCEDURE — U0002 COVID-19 LAB TEST NON-CDC: HCPCS | Performed by: OBSTETRICS & GYNECOLOGY

## 2022-05-04 PROCEDURE — 86850 RBC ANTIBODY SCREEN: CPT | Performed by: OBSTETRICS & GYNECOLOGY

## 2022-05-04 PROCEDURE — 71000033 HC RECOVERY, INTIAL HOUR: Performed by: OBSTETRICS & GYNECOLOGY

## 2022-05-04 PROCEDURE — 25000003 PHARM REV CODE 250: Performed by: STUDENT IN AN ORGANIZED HEALTH CARE EDUCATION/TRAINING PROGRAM

## 2022-05-04 PROCEDURE — 71000039 HC RECOVERY, EACH ADD'L HOUR: Performed by: OBSTETRICS & GYNECOLOGY

## 2022-05-04 PROCEDURE — 37000009 HC ANESTHESIA EA ADD 15 MINS: Performed by: OBSTETRICS & GYNECOLOGY

## 2022-05-04 PROCEDURE — 59514 CESAREAN DELIVERY ONLY: CPT | Mod: GB,,, | Performed by: OBSTETRICS & GYNECOLOGY

## 2022-05-04 PROCEDURE — 51702 INSERT TEMP BLADDER CATH: CPT

## 2022-05-04 PROCEDURE — 25000003 PHARM REV CODE 250: Performed by: OBSTETRICS & GYNECOLOGY

## 2022-05-04 PROCEDURE — 59514 PR CESAREAN DELIVERY ONLY: ICD-10-PCS | Mod: GB,,, | Performed by: OBSTETRICS & GYNECOLOGY

## 2022-05-04 RX ORDER — ACETAMINOPHEN 325 MG/1
650 TABLET ORAL
Status: CANCELLED | OUTPATIENT
Start: 2022-05-05

## 2022-05-04 RX ORDER — FAMOTIDINE 10 MG/ML
20 INJECTION INTRAVENOUS
Status: CANCELLED | OUTPATIENT
Start: 2022-05-04

## 2022-05-04 RX ORDER — FAMOTIDINE 10 MG/ML
20 INJECTION INTRAVENOUS ONCE
Status: COMPLETED | OUTPATIENT
Start: 2022-05-04 | End: 2022-05-04

## 2022-05-04 RX ORDER — SODIUM CHLORIDE, SODIUM LACTATE, POTASSIUM CHLORIDE, CALCIUM CHLORIDE 600; 310; 30; 20 MG/100ML; MG/100ML; MG/100ML; MG/100ML
INJECTION, SOLUTION INTRAVENOUS CONTINUOUS
Status: CANCELLED | OUTPATIENT
Start: 2022-05-04

## 2022-05-04 RX ORDER — BUPIVACAINE HYDROCHLORIDE 7.5 MG/ML
INJECTION, SOLUTION INTRASPINAL
Status: DISCONTINUED | OUTPATIENT
Start: 2022-05-04 | End: 2022-05-04

## 2022-05-04 RX ORDER — OXYCODONE HYDROCHLORIDE 5 MG/1
5 TABLET ORAL EVERY 4 HOURS PRN
Status: CANCELLED | OUTPATIENT
Start: 2022-05-04

## 2022-05-04 RX ORDER — KETOROLAC TROMETHAMINE 30 MG/ML
30 INJECTION, SOLUTION INTRAMUSCULAR; INTRAVENOUS EVERY 6 HOURS
Status: DISCONTINUED | OUTPATIENT
Start: 2022-05-04 | End: 2022-05-04

## 2022-05-04 RX ORDER — PROCHLORPERAZINE EDISYLATE 5 MG/ML
5 INJECTION INTRAMUSCULAR; INTRAVENOUS EVERY 6 HOURS PRN
Status: CANCELLED | OUTPATIENT
Start: 2022-05-04

## 2022-05-04 RX ORDER — PROMETHAZINE HYDROCHLORIDE 25 MG/ML
INJECTION, SOLUTION INTRAMUSCULAR; INTRAVENOUS
Status: DISCONTINUED | OUTPATIENT
Start: 2022-05-04 | End: 2022-05-04

## 2022-05-04 RX ORDER — ACETAMINOPHEN 325 MG/1
650 TABLET ORAL EVERY 6 HOURS
Status: COMPLETED | OUTPATIENT
Start: 2022-05-04 | End: 2022-05-05

## 2022-05-04 RX ORDER — TRANEXAMIC ACID 100 MG/ML
1000 INJECTION, SOLUTION INTRAVENOUS ONCE AS NEEDED
Status: CANCELLED | OUTPATIENT
Start: 2022-05-04 | End: 2033-09-30

## 2022-05-04 RX ORDER — OXYCODONE HYDROCHLORIDE 5 MG/1
10 TABLET ORAL EVERY 4 HOURS PRN
Status: CANCELLED | OUTPATIENT
Start: 2022-05-04

## 2022-05-04 RX ORDER — ACETAMINOPHEN 500 MG
1000 TABLET ORAL ONCE
Status: COMPLETED | OUTPATIENT
Start: 2022-05-04 | End: 2022-05-04

## 2022-05-04 RX ORDER — PHENYLEPHRINE HYDROCHLORIDE 10 MG/ML
INJECTION INTRAVENOUS
Status: DISCONTINUED | OUTPATIENT
Start: 2022-05-04 | End: 2022-05-04

## 2022-05-04 RX ORDER — SODIUM CHLORIDE, SODIUM LACTATE, POTASSIUM CHLORIDE, CALCIUM CHLORIDE 600; 310; 30; 20 MG/100ML; MG/100ML; MG/100ML; MG/100ML
INJECTION, SOLUTION INTRAVENOUS CONTINUOUS
Status: DISCONTINUED | OUTPATIENT
Start: 2022-05-04 | End: 2022-05-06 | Stop reason: HOSPADM

## 2022-05-04 RX ORDER — ONDANSETRON HYDROCHLORIDE 2 MG/ML
INJECTION, SOLUTION INTRAMUSCULAR; INTRAVENOUS
Status: DISCONTINUED | OUTPATIENT
Start: 2022-05-04 | End: 2022-05-04

## 2022-05-04 RX ORDER — ONDANSETRON 2 MG/ML
4 INJECTION INTRAMUSCULAR; INTRAVENOUS EVERY 6 HOURS PRN
Status: ACTIVE | OUTPATIENT
Start: 2022-05-04 | End: 2022-05-05

## 2022-05-04 RX ORDER — SODIUM CITRATE AND CITRIC ACID MONOHYDRATE 334; 500 MG/5ML; MG/5ML
30 SOLUTION ORAL
Status: CANCELLED | OUTPATIENT
Start: 2022-05-04

## 2022-05-04 RX ORDER — NALBUPHINE HYDROCHLORIDE 10 MG/ML
5 INJECTION, SOLUTION INTRAMUSCULAR; INTRAVENOUS; SUBCUTANEOUS ONCE AS NEEDED
Status: CANCELLED | OUTPATIENT
Start: 2022-05-04 | End: 2033-09-30

## 2022-05-04 RX ORDER — PROCHLORPERAZINE EDISYLATE 5 MG/ML
5 INJECTION INTRAMUSCULAR; INTRAVENOUS EVERY 6 HOURS PRN
Status: DISCONTINUED | OUTPATIENT
Start: 2022-05-04 | End: 2022-05-06 | Stop reason: HOSPADM

## 2022-05-04 RX ORDER — FENTANYL CITRATE 50 UG/ML
INJECTION, SOLUTION INTRAMUSCULAR; INTRAVENOUS
Status: DISCONTINUED | OUTPATIENT
Start: 2022-05-04 | End: 2022-05-04

## 2022-05-04 RX ORDER — OXYCODONE HYDROCHLORIDE 5 MG/1
5 TABLET ORAL EVERY 4 HOURS PRN
Status: DISPENSED | OUTPATIENT
Start: 2022-05-04 | End: 2022-05-05

## 2022-05-04 RX ORDER — SODIUM CHLORIDE, SODIUM LACTATE, POTASSIUM CHLORIDE, CALCIUM CHLORIDE 600; 310; 30; 20 MG/100ML; MG/100ML; MG/100ML; MG/100ML
INJECTION, SOLUTION INTRAVENOUS CONTINUOUS PRN
Status: DISCONTINUED | OUTPATIENT
Start: 2022-05-04 | End: 2022-05-04

## 2022-05-04 RX ORDER — MORPHINE SULFATE 0.5 MG/ML
INJECTION, SOLUTION EPIDURAL; INTRATHECAL; INTRAVENOUS
Status: DISCONTINUED | OUTPATIENT
Start: 2022-05-04 | End: 2022-05-04

## 2022-05-04 RX ORDER — MORPHINE SULFATE 0.5 MG/ML
INJECTION, SOLUTION EPIDURAL; INTRATHECAL; INTRAVENOUS
Status: COMPLETED
Start: 2022-05-04 | End: 2022-05-04

## 2022-05-04 RX ORDER — CARBOPROST TROMETHAMINE 250 UG/ML
250 INJECTION, SOLUTION INTRAMUSCULAR
Status: CANCELLED | OUTPATIENT
Start: 2022-05-04

## 2022-05-04 RX ORDER — IBUPROFEN 400 MG/1
800 TABLET ORAL
Status: CANCELLED | OUTPATIENT
Start: 2022-05-05

## 2022-05-04 RX ORDER — OXYTOCIN 10 [USP'U]/ML
INJECTION, SOLUTION INTRAMUSCULAR; INTRAVENOUS
Status: DISCONTINUED | OUTPATIENT
Start: 2022-05-04 | End: 2022-05-04

## 2022-05-04 RX ORDER — OXYCODONE HYDROCHLORIDE 5 MG/1
10 TABLET ORAL EVERY 4 HOURS PRN
Status: DISPENSED | OUTPATIENT
Start: 2022-05-04 | End: 2022-05-05

## 2022-05-04 RX ORDER — MIDAZOLAM HYDROCHLORIDE 1 MG/ML
INJECTION INTRAMUSCULAR; INTRAVENOUS
Status: DISCONTINUED | OUTPATIENT
Start: 2022-05-04 | End: 2022-05-04

## 2022-05-04 RX ORDER — MUPIROCIN 20 MG/G
OINTMENT TOPICAL
Status: DISCONTINUED | OUTPATIENT
Start: 2022-05-04 | End: 2022-05-06 | Stop reason: HOSPADM

## 2022-05-04 RX ORDER — FAMOTIDINE 10 MG/ML
20 INJECTION INTRAVENOUS 2 TIMES DAILY
Status: DISCONTINUED | OUTPATIENT
Start: 2022-05-04 | End: 2022-05-04

## 2022-05-04 RX ORDER — METHYLERGONOVINE MALEATE 0.2 MG/ML
200 INJECTION INTRAVENOUS ONCE AS NEEDED
Status: CANCELLED | OUTPATIENT
Start: 2022-05-04 | End: 2033-09-30

## 2022-05-04 RX ORDER — MISOPROSTOL 200 UG/1
800 TABLET ORAL ONCE AS NEEDED
Status: CANCELLED | OUTPATIENT
Start: 2022-05-04 | End: 2033-09-30

## 2022-05-04 RX ORDER — SODIUM CITRATE AND CITRIC ACID MONOHYDRATE 334; 500 MG/5ML; MG/5ML
30 SOLUTION ORAL ONCE
Status: COMPLETED | OUTPATIENT
Start: 2022-05-04 | End: 2022-05-04

## 2022-05-04 RX ORDER — DEXAMETHASONE SODIUM PHOSPHATE 4 MG/ML
INJECTION, SOLUTION INTRA-ARTICULAR; INTRALESIONAL; INTRAMUSCULAR; INTRAVENOUS; SOFT TISSUE
Status: DISCONTINUED | OUTPATIENT
Start: 2022-05-04 | End: 2022-05-04

## 2022-05-04 RX ORDER — CEFAZOLIN SODIUM 2 G/50ML
2 SOLUTION INTRAVENOUS
Status: DISCONTINUED | OUTPATIENT
Start: 2022-05-04 | End: 2022-05-06 | Stop reason: HOSPADM

## 2022-05-04 RX ORDER — KETOROLAC TROMETHAMINE 30 MG/ML
30 INJECTION, SOLUTION INTRAMUSCULAR; INTRAVENOUS
Status: CANCELLED | OUTPATIENT
Start: 2022-05-04 | End: 2022-05-05

## 2022-05-04 RX ORDER — ACETAMINOPHEN 500 MG
1000 TABLET ORAL
Status: CANCELLED | OUTPATIENT
Start: 2022-05-04

## 2022-05-04 RX ORDER — SODIUM CHLORIDE 9 MG/ML
INJECTION, SOLUTION INTRAVENOUS CONTINUOUS
Status: DISCONTINUED | OUTPATIENT
Start: 2022-05-04 | End: 2022-05-06 | Stop reason: HOSPADM

## 2022-05-04 RX ORDER — OXYTOCIN/RINGER'S LACTATE 30/500 ML
95 PLASTIC BAG, INJECTION (ML) INTRAVENOUS CONTINUOUS
Status: DISCONTINUED | OUTPATIENT
Start: 2022-05-04 | End: 2022-05-06 | Stop reason: HOSPADM

## 2022-05-04 RX ORDER — KETOROLAC TROMETHAMINE 30 MG/ML
30 INJECTION, SOLUTION INTRAMUSCULAR; INTRAVENOUS EVERY 6 HOURS
Status: COMPLETED | OUTPATIENT
Start: 2022-05-04 | End: 2022-05-05

## 2022-05-04 RX ORDER — CEFAZOLIN SODIUM 1 G/3ML
INJECTION, POWDER, FOR SOLUTION INTRAMUSCULAR; INTRAVENOUS
Status: DISCONTINUED | OUTPATIENT
Start: 2022-05-04 | End: 2022-05-04

## 2022-05-04 RX ADMIN — PHENYLEPHRINE HYDROCHLORIDE 100 MCG: 10 INJECTION INTRAVENOUS at 04:05

## 2022-05-04 RX ADMIN — Medication 0.1 MG: at 03:05

## 2022-05-04 RX ADMIN — PROMETHAZINE HYDROCHLORIDE 6.25 MG: 25 INJECTION INTRAMUSCULAR; INTRAVENOUS at 03:05

## 2022-05-04 RX ADMIN — SODIUM CHLORIDE, SODIUM LACTATE, POTASSIUM CHLORIDE, AND CALCIUM CHLORIDE: .6; .31; .03; .02 INJECTION, SOLUTION INTRAVENOUS at 03:05

## 2022-05-04 RX ADMIN — ACETAMINOPHEN 1000 MG: 500 TABLET, FILM COATED ORAL at 03:05

## 2022-05-04 RX ADMIN — BUPIVACAINE HYDROCHLORIDE IN DEXTROSE 1.6 ML: 7.5 INJECTION, SOLUTION SUBARACHNOID at 03:05

## 2022-05-04 RX ADMIN — PHENYLEPHRINE HYDROCHLORIDE 100 MCG: 10 INJECTION INTRAVENOUS at 03:05

## 2022-05-04 RX ADMIN — Medication 95 MILLI-UNITS/MIN: at 06:05

## 2022-05-04 RX ADMIN — ONDANSETRON 4 MG: 2 INJECTION, SOLUTION INTRAMUSCULAR; INTRAVENOUS at 04:05

## 2022-05-04 RX ADMIN — FENTANYL CITRATE 10 MCG: 50 INJECTION, SOLUTION INTRAMUSCULAR; INTRAVENOUS at 03:05

## 2022-05-04 RX ADMIN — OXYTOCIN 18 UNITS: 10 INJECTION, SOLUTION INTRAMUSCULAR; INTRAVENOUS at 04:05

## 2022-05-04 RX ADMIN — MIDAZOLAM HYDROCHLORIDE 2 MG: 1 INJECTION, SOLUTION INTRAMUSCULAR; INTRAVENOUS at 03:05

## 2022-05-04 RX ADMIN — PHENYLEPHRINE HYDROCHLORIDE 0.5 MCG/MIN: 10 INJECTION INTRAVENOUS at 03:05

## 2022-05-04 RX ADMIN — SODIUM CHLORIDE, SODIUM LACTATE, POTASSIUM CHLORIDE, AND CALCIUM CHLORIDE: 600; 310; 30; 20 INJECTION, SOLUTION INTRAVENOUS at 03:05

## 2022-05-04 RX ADMIN — SODIUM CITRATE AND CITRIC ACID MONOHYDRATE 30 ML: 500; 334 SOLUTION ORAL at 03:05

## 2022-05-04 RX ADMIN — CEFAZOLIN 2 G: 330 INJECTION, POWDER, FOR SOLUTION INTRAMUSCULAR; INTRAVENOUS at 04:05

## 2022-05-04 RX ADMIN — DEXAMETHASONE SODIUM PHOSPHATE 4 MG: 4 INJECTION INTRA-ARTICULAR; INTRALESIONAL; INTRAMUSCULAR; INTRAVENOUS; SOFT TISSUE at 04:05

## 2022-05-04 RX ADMIN — FAMOTIDINE 20 MG: 10 INJECTION, SOLUTION INTRAVENOUS at 03:05

## 2022-05-04 RX ADMIN — SODIUM CHLORIDE, SODIUM LACTATE, POTASSIUM CHLORIDE, AND CALCIUM CHLORIDE: .6; .31; .03; .02 INJECTION, SOLUTION INTRAVENOUS at 12:05

## 2022-05-04 RX ADMIN — KETOROLAC TROMETHAMINE 30 MG: 30 INJECTION, SOLUTION INTRAMUSCULAR at 06:05

## 2022-05-04 NOTE — ANESTHESIA PROCEDURE NOTES
Spinal    Diagnosis: IUP  Patient location during procedure: OR    Staffing  Authorizing Provider: Gali Grier MD  Performing Provider: Rodriguez Lebron DO    Preanesthetic Checklist  Completed: patient identified, IV checked, site marked, risks and benefits discussed, surgical consent, monitors and equipment checked, pre-op evaluation and timeout performed  Spinal Block  Patient position: sitting  Prep: ChloraPrep  Patient monitoring: heart rate, continuous pulse ox and frequent blood pressure checks  Approach: midline  Location: L3-4  Injection technique: single shot  CSF Fluid: clear free-flowing CSF  Needle  Needle type: Senthil   Needle gauge: 25 G  Needle length: 3.5 in  Additional Documentation: incremental injection, negative aspiration for heme and no paresthesia on injection  Needle localization: anatomical landmarks  Assessment  Sensory level: T4   Dermatomal levels determined by pinch or prick  Patient's tolerance of the procedure: comfortable throughout block

## 2022-05-04 NOTE — L&D DELIVERY NOTE
[facility] - Labor & Delivery   Section   Operative Note    SUMMARY      Section Operative Note  Procedure Date: 2022    Procedure: Repeat  Section via Pfannenstiel skin incision    Indications:  repeat    Pre-operative Diagnosis:  1.  IUP at 39 week 0 day pregnancy  2. H/o CSDx1  3. Trichomoniasis     Post-operative Diagnosis:   1/ s/p rLTCS    Surgeon: Dr. Trevor Wu     Assistants: Yolande Mcdowell PGY1    Anesthesia: Spinal anesthesia    Findings:   1. Viable male infant in cephalic presentation  2. Normal appearing placenta, discarded  3. Anatomically normal appearing uterus, bilateral tubes and ovaries    Estimated Blood Loss:   680  cc           Total IV Fluids: see anesthesia report     UOP: see anesthesia report    Specimens: single viable male infant , Placenta which was discarded    PreOp CBC:   Lab Results   Component Value Date    WBC 7.42 2022    HGB 11.3 (L) 2022    HCT 33.1 (L) 2022    MCV 89 2022     2022                     Complications:  None; patient tolerated the procedure well.           Disposition: PACU - hemodynamically stable.           Condition: stable    Procedure Details   The patient was seen in the Pre-op Holding Room. The risks, benefits, complications, treatment options, and expected outcomes were discussed with the patient.  The patient concurred with the proposed plan, giving informed consent.  The site of surgery properly noted/marked. The patient was taken to Operating Room, identified as Noemí Elias and the procedure verified as  Delivery. A Time Out was held and the above information confirmed.    After induction of anesthesia, the patient was prepped and draped in the usual sterile manner while placed in a dorsal supine position with a left lateral tilt. SCD's placed and cycling. A hubbard catheter was also placed per nursing. Preoperative antibiotics were administered and an allis test was  performed yielding adequate anesthesia.  A Pfannenstiel incision was made and carried down through the subcutaneous tissue to the fascia. Fascial incision was made and extended transversely. The fascia was grasped with Kocher clamps and  from the underlying rectus tissue superiorly and inferiorly. The peritoneum was identified, found to be free of adherent bowel and entered sharply with hemostats and metzenbaum scissors. Peritoneal incision was extended longitudinally. The vesico-uterine peritoneum was identified and bladder blade was inserted. The vesico-uterine peritoneum was incised transversely and the bladder flap was bluntly freed from the lower uterine segment. The bladder blade was reinserted to keep the bladder out of the operative field. A low transverse uterine incision was made with knife and extended with cephalocaudad traction. The amniotic sac was ruptured with a hemostat and the infant was noted to be in vertex position. The fetal head was brought to the incision and elevated out of the pelvis. The patient delivered a single viable male infant without difficulty.  Infant weighed 3010 grams with Apgar scores of 9/9 at one and five minutes respectively. After the umbilical cord was clamped and cut cord blood was obtained for evaluation. The placenta was removed intact and appeared normal and was discarded. The uterus was exteriorized. The uterine outline, tubes and ovaries appeared normal. The uterine incision was closed with running locked sutures of 0 moncryl. A second layer of 0 Monocryl was used for an imbricating layer. A small area of oozing on the right aspect of there hysterotomy was noted and a figure of 8 stitch was placed with 0 monocryl. Hemostasis was observed. The uterus was returned to the abdominal cavity. Incision was reinspected and good hemostasis was noted. The abdominal cavity was wiped with clean laps to remove clots.  The muscle and fascial layers were carefully  "inspected and made hemostatic.The fascia was then reapproximated with running sutures of 1 vicryl. The subcutaneous fat was reapproximated with 2-0 vicryl. The skin was reapproximated with 4-0 monocryl in subcuticular fashion.    Instrument, sponge, and needle counts were correct prior the abdominal closure and at the conclusion of the case.     Pt tolerated procedure well and was taken to recovery in stable condition    Yolande Mcdowell MD  OBGYN PGY-1      Delivery Information for Valentín Elias    Birth information:  YOB: 2023   Time of birth: 4:17 PM   Sex: male   Head Delivery Date/Time: 2022  4:17 PM   Delivery type: , Low Transverse   Gestational Age: 39w0d    Delivery Providers    Delivering clinician: Trevor Wu MD   Provider Role    NORTH Hawthorne      NORTH Franks MD Emma Siewert, MD           Measurements    Weight: 3010 g  Weight (lbs): 6 lb 10.2 oz  Length: 48.3 cm  Length (in): 19"  Head circumference: 34 cm  Chest circumference: 33 cm         Apgars    Living status: Living  Apgars:  1 min.:  5 min.:  10 min.:  15 min.:  20 min.:    Skin color:  1  1       Heart rate:  2  2       Reflex irritability:  2  2       Muscle tone:  2  2       Respiratory effort:  2  2       Total:  9  9       Apgars assigned by: KATHY STACY RN         Operative Delivery    Forceps attempted?: No  Vacuum extractor attempted?: No         Shoulder Dystocia    Shoulder dystocia present?: No           Presentation    Presentation: Vertex           Interventions/Resuscitation    Method: Bulb Suctioning, Tactile Stimulation       Cord    Vessels: 3 vessels  Complications: None  Delayed Cord Clamping?: No  Cord Blood Disposition: Sent with Baby  Gases Sent?: No  Stem Cell Collection (by MD): No       Placenta    Placenta delivery date/time: 2022 1619  Placenta removal: Spontaneous  Placenta appearance: Intact  Placenta disposition: " discarded           Labor Events:       labor:       Labor Onset Date/Time:         Dilation Complete Date/Time:         Start Pushing Date/Time:         Start Pushing Date/Time:       Rupture Date/Time: 22  1616 (in OR)         Rupture type:            Fluid Amount:         Fluid Color:         Fluid Odor:         Membrane Status: ARM (Artificial Rupture)                 steroids:       Antibiotics given for GBS:       Induction:       Indications for induction:        Augmentation:       Indications for augmentation:       Labor complications:       Additional complications:          Cervical ripening:                     Delivery:      Episiotomy:       Indication for Episiotomy:       Perineal Lacerations:   Repaired:      Periurethral Laceration:   Repaired:     Labial Laceration:   Repaired:     Sulcus Laceration:   Repaired:     Vaginal Laceration:   Repaired:     Cervical Laceration:   Repaired:     Repair suture:       Repair # of packets:       Last Value - EBL - Nursing (mL):       Sum - EBL - Nursing (mL): 0     Last Value - EBL - Anesthesia (mL):        Calculated QBL (mL): 680        Vaginal Sweep Performed:       Surgicount Correct: Yes       Other providers:       Anesthesia    Method: Spinal          Details (if applicable):  Trial of Labor No    Categorization: Repeat    Priority: Routine   Indications for : Repeat Section   Incision Type: low transverse     Additional  information:  Forceps:    Vacuum:    Breech:    Observed anomalies    Other (Comments):       I was present and scrubbed for the surgery as listed above. I agree with the contents of the operative report and delivery summary as documented above.     Trevor Wu  2022

## 2022-05-04 NOTE — ANESTHESIA PREPROCEDURE EVALUATION
Noemí Elias is a 26 y.o. [id number] at 37w1d who presents for scheduled C section. Her pregnancy has been complicated by obesity, history of  section, trichomonas on pap smear, nausea and vomiting of pregnancy.     OB History    Para Term  AB Living   2 1 1     1   SAB IAB Ectopic Multiple Live Births         0 1      # Outcome Date GA Lbr Maurilio/2nd Weight Sex Delivery Anes PTL Lv   2 Current            1 Term 19 39w2d  3.52 kg (7 lb 12.2 oz) F CS-LTranv EPI N MICHELLE      Complications: Fetal Intolerance       Wt Readings from Last 1 Encounters:   22 1525 107.9 kg (237 lb 14 oz)       BP Readings from Last 3 Encounters:   22 108/70   22 (!) 96/59   22 108/70       Patient Active Problem List   Diagnosis    Syphilis affecting pregnancy in first trimester    S/P emergency        Past Surgical History:   Procedure Laterality Date     SECTION N/A 3/30/2019    Procedure:  SECTION;  Surgeon: Flor Kuhn MD;  Location: [facility] L&D;  Service: OB/GYN;  Laterality: N/A;       Social History     Socioeconomic History    Marital status: Single   Tobacco Use    Smoking status: Former Smoker    Smokeless tobacco: Former User   Substance and Sexual Activity    Alcohol use: No    Drug use: Yes     Types: Marijuana    Sexual activity: Yes     Partners: Male         Chemistry        Component Value Date/Time     2022 1603    K 3.4 (L) 2022 1603     2022 1603    CO2 20 (L) 2022 1603    BUN 6 2022 1603    CREATININE 0.7 2022 1603    GLU 81 2022 1603        Component Value Date/Time    CALCIUM 8.5 (L) 2022 1603    ALKPHOS 98 2022 1603    AST 14 2022 1603    ALT 12 2022 1603    BILITOT 0.3 2022 1603    ESTGFRAFRICA >60 2022 1603    EGFRNONAA >60 2022 1603            Lab Results   Component Value Date    WBC 7.42 2022    HGB 11.3 (L) 2022     HCT 33.1 (L) 2022    MCV 89 2022     2022       No results for input(s): PT, INR, PROTIME, APTT in the last 72 hours.                Pre-op Assessment    I have reviewed the Patient Summary Reports.     I have reviewed the Nursing Notes.    I have reviewed the Medications.     Review of Systems  Anesthesia Hx:  No problems with previous Anesthesia  Neg history of prior surgery. Denies Family Hx of Anesthesia complications.    Social:  No Alcohol Use, Non-Smoker    Hematology/Oncology:  Hematology Normal   Oncology Normal     EENT/Dental:EENT/Dental Normal   Cardiovascular:  Cardiovascular Normal  Denies Hypertension.   Denies Angina.    Pulmonary:  Pulmonary Normal  Denies COPD.  Denies Asthma.    Renal/:  Renal/ Normal  Denies Chronic Renal Disease.     Hepatic/GI:  Hepatic/GI Normal Denies Liver Disease.    Musculoskeletal:  Musculoskeletal Normal    OB/GYN/PEDS:  , planned     Neurological:  Neurology Normal  Denies CVA. Denies Seizures.    Endocrine:  Endocrine Normal Denies Diabetes.  Morbid Obesity / BMI > 40      Physical Exam  General: Well nourished and Cooperative    Airway:  Mallampati: II   Mouth Opening: Normal  TM Distance: Normal  Tongue: Normal  Neck ROM: Normal ROM    Dental:  Intact    Chest/Lungs:  Clear to auscultation, Normal Respiratory Rate    Heart:  Rate: Normal  Rhythm: Regular Rhythm        Anesthesia Plan  Type of Anesthesia, risks & benefits discussed:    Anesthesia Type: Epidural, Spinal  Intra-op Monitoring Plan: Standard ASA Monitors  Post Op Pain Control Plan: multimodal analgesia, IV/PO Opioids PRN, epidural analgesia and intrathecal opioid  Induction:  IV  Informed Consent: Informed consent signed with the Patient and all parties understand the risks and agree with anesthesia plan.  All questions answered.   ASA Score: 3  Day of Surgery Review of History & Physical: H&P Update referred to the surgeon/provider.    Ready For Surgery From  Anesthesia Perspective.     .

## 2022-05-04 NOTE — CARE UPDATE
Reviewed strip as patient previously was not on monitor at the time of the H&P:     NST: 135, reactive/reassuring. CTX: quiet    Will continue to monitor until patient proceeds to OR for CS.     Debora Flanagan MD  OB/GYN  PGY-4

## 2022-05-04 NOTE — INTERVAL H&P NOTE
The patient has been examined and the H&P has been reviewed:    I concur with the findings and no changes have occurred since H&P was written.    Surgery risks, benefits and alternative options discussed and understood by patient/family.    US: vertex position verified  SVE: deferred  NST: patient yet to be placed on monitor at the time of this note, will review     Patient denies contractions, denies vaginal bleeding, denies LOF.   Fetal Movement: normal.     Debora Flanagan MD  OB/GYN  PGY-4      There are no hospital problems to display for this patient.    I have personally seen and evaluated the patient. I have reviewed the residents note as documented above and agree with the documentation. Reactive NST. Questions answered.     Trevor Wu  5/4/2022

## 2022-05-04 NOTE — TRANSFER OF CARE
"Anesthesia Transfer of Care Note    Patient: Noemí Elias    Procedure(s) Performed: Procedure(s) (LRB):   SECTION (N/A)    Patient location: Labor and Delivery    Anesthesia Type: spinal    Transport from OR: Transported from OR on 6-10 L/min O2 by face mask with adequate spontaneous ventilation    Post pain: adequate analgesia    Post assessment: tolerated procedure well and no apparent anesthetic complications    Post vital signs: stable    Level of consciousness: awake    Nausea/Vomiting: no nausea/vomiting    Complications: none    Transfer of care protocol was followed      Last vitals:   Visit Vitals  /63   Pulse 69   Temp 36.6 °C (97.9 °F) (Oral)   Ht 5' 5" (1.651 m)   Wt 108 kg (238 lb 1.6 oz)   SpO2 100%   Breastfeeding No   BMI 39.62 kg/m²     "

## 2022-05-05 LAB
BASOPHILS # BLD AUTO: 0.03 K/UL (ref 0–0.2)
BASOPHILS NFR BLD: 0.2 % (ref 0–1.9)
DIFFERENTIAL METHOD: ABNORMAL
EOSINOPHIL # BLD AUTO: 0 K/UL (ref 0–0.5)
EOSINOPHIL NFR BLD: 0.3 % (ref 0–8)
ERYTHROCYTE [DISTWIDTH] IN BLOOD BY AUTOMATED COUNT: 13.7 % (ref 11.5–14.5)
HCT VFR BLD AUTO: 32.4 % (ref 37–48.5)
HGB BLD-MCNC: 10.9 G/DL (ref 12–16)
IMM GRANULOCYTES # BLD AUTO: 0.09 K/UL (ref 0–0.04)
IMM GRANULOCYTES NFR BLD AUTO: 0.6 % (ref 0–0.5)
LYMPHOCYTES # BLD AUTO: 2.8 K/UL (ref 1–4.8)
LYMPHOCYTES NFR BLD: 19.3 % (ref 18–48)
MCH RBC QN AUTO: 30 PG (ref 27–31)
MCHC RBC AUTO-ENTMCNC: 33.6 G/DL (ref 32–36)
MCV RBC AUTO: 89 FL (ref 82–98)
MONOCYTES # BLD AUTO: 1.3 K/UL (ref 0.3–1)
MONOCYTES NFR BLD: 8.7 % (ref 4–15)
NEUTROPHILS # BLD AUTO: 10.4 K/UL (ref 1.8–7.7)
NEUTROPHILS NFR BLD: 70.9 % (ref 38–73)
NRBC BLD-RTO: 0 /100 WBC
PLATELET # BLD AUTO: 309 K/UL (ref 150–450)
PMV BLD AUTO: 9.6 FL (ref 9.2–12.9)
RBC # BLD AUTO: 3.63 M/UL (ref 4–5.4)
RPR SER QL: NORMAL
WBC # BLD AUTO: 14.65 K/UL (ref 3.9–12.7)

## 2022-05-05 PROCEDURE — 11000001 HC ACUTE MED/SURG PRIVATE ROOM

## 2022-05-05 PROCEDURE — 25000003 PHARM REV CODE 250: Performed by: STUDENT IN AN ORGANIZED HEALTH CARE EDUCATION/TRAINING PROGRAM

## 2022-05-05 PROCEDURE — 25000003 PHARM REV CODE 250

## 2022-05-05 PROCEDURE — 36415 COLL VENOUS BLD VENIPUNCTURE: CPT | Performed by: OBSTETRICS & GYNECOLOGY

## 2022-05-05 PROCEDURE — 99231 PR SUBSEQUENT HOSPITAL CARE,LEVL I: ICD-10-PCS | Mod: ,,, | Performed by: OBSTETRICS & GYNECOLOGY

## 2022-05-05 PROCEDURE — 85025 COMPLETE CBC W/AUTO DIFF WBC: CPT | Performed by: OBSTETRICS & GYNECOLOGY

## 2022-05-05 PROCEDURE — 25000003 PHARM REV CODE 250: Performed by: OBSTETRICS & GYNECOLOGY

## 2022-05-05 PROCEDURE — 63600175 PHARM REV CODE 636 W HCPCS: Performed by: OBSTETRICS & GYNECOLOGY

## 2022-05-05 PROCEDURE — 99231 SBSQ HOSP IP/OBS SF/LOW 25: CPT | Mod: ,,, | Performed by: OBSTETRICS & GYNECOLOGY

## 2022-05-05 RX ORDER — IBUPROFEN 800 MG/1
800 TABLET ORAL EVERY 8 HOURS PRN
Qty: 60 TABLET | Refills: 1 | Status: SHIPPED | OUTPATIENT
Start: 2022-05-05

## 2022-05-05 RX ORDER — DIPHENHYDRAMINE HYDROCHLORIDE 50 MG/ML
12.5 INJECTION INTRAMUSCULAR; INTRAVENOUS
Status: DISCONTINUED | OUTPATIENT
Start: 2022-05-05 | End: 2022-05-06 | Stop reason: HOSPADM

## 2022-05-05 RX ORDER — OXYCODONE HYDROCHLORIDE 5 MG/1
10 TABLET ORAL EVERY 6 HOURS PRN
Status: DISCONTINUED | OUTPATIENT
Start: 2022-05-05 | End: 2022-05-06 | Stop reason: HOSPADM

## 2022-05-05 RX ORDER — OXYCODONE HYDROCHLORIDE 5 MG/1
5 TABLET ORAL EVERY 6 HOURS PRN
Status: DISCONTINUED | OUTPATIENT
Start: 2022-05-05 | End: 2022-05-06 | Stop reason: HOSPADM

## 2022-05-05 RX ORDER — SIMETHICONE 80 MG
1 TABLET,CHEWABLE ORAL EVERY 6 HOURS PRN
Status: DISCONTINUED | OUTPATIENT
Start: 2022-05-05 | End: 2022-05-06 | Stop reason: HOSPADM

## 2022-05-05 RX ORDER — PRENATAL WITH FERROUS FUM AND FOLIC ACID 3080; 920; 120; 400; 22; 1.84; 3; 20; 10; 1; 12; 200; 27; 25; 2 [IU]/1; [IU]/1; MG/1; [IU]/1; MG/1; MG/1; MG/1; MG/1; MG/1; MG/1; UG/1; MG/1; MG/1; MG/1; MG/1
1 TABLET ORAL DAILY
Status: DISCONTINUED | OUTPATIENT
Start: 2022-05-05 | End: 2022-05-06 | Stop reason: HOSPADM

## 2022-05-05 RX ORDER — DIPHENHYDRAMINE HCL 25 MG
25 CAPSULE ORAL EVERY 6 HOURS PRN
Status: DISCONTINUED | OUTPATIENT
Start: 2022-05-05 | End: 2022-05-06 | Stop reason: HOSPADM

## 2022-05-05 RX ORDER — AMOXICILLIN 250 MG
1 CAPSULE ORAL NIGHTLY PRN
Qty: 30 TABLET | Refills: 2 | Status: SHIPPED | OUTPATIENT
Start: 2022-05-05

## 2022-05-05 RX ORDER — HYDROCORTISONE 25 MG/G
CREAM TOPICAL 3 TIMES DAILY PRN
Status: DISCONTINUED | OUTPATIENT
Start: 2022-05-05 | End: 2022-05-06 | Stop reason: HOSPADM

## 2022-05-05 RX ORDER — ONDANSETRON 2 MG/ML
4 INJECTION INTRAMUSCULAR; INTRAVENOUS EVERY 6 HOURS PRN
Status: DISCONTINUED | OUTPATIENT
Start: 2022-05-05 | End: 2022-05-06 | Stop reason: HOSPADM

## 2022-05-05 RX ORDER — IBUPROFEN 400 MG/1
800 TABLET ORAL EVERY 8 HOURS
Status: DISCONTINUED | OUTPATIENT
Start: 2022-05-05 | End: 2022-05-06 | Stop reason: HOSPADM

## 2022-05-05 RX ORDER — AMOXICILLIN 250 MG
1 CAPSULE ORAL NIGHTLY PRN
Status: DISCONTINUED | OUTPATIENT
Start: 2022-05-05 | End: 2022-05-06 | Stop reason: HOSPADM

## 2022-05-05 RX ORDER — OXYCODONE HYDROCHLORIDE 5 MG/1
5 TABLET ORAL EVERY 4 HOURS PRN
Qty: 15 TABLET | Refills: 0 | Status: SHIPPED | OUTPATIENT
Start: 2022-05-05 | End: 2022-05-23

## 2022-05-05 RX ORDER — DOCUSATE SODIUM 100 MG/1
200 CAPSULE, LIQUID FILLED ORAL 2 TIMES DAILY
Status: DISCONTINUED | OUTPATIENT
Start: 2022-05-05 | End: 2022-05-06 | Stop reason: HOSPADM

## 2022-05-05 RX ORDER — ONDANSETRON 8 MG/1
8 TABLET, ORALLY DISINTEGRATING ORAL EVERY 8 HOURS PRN
Status: DISCONTINUED | OUTPATIENT
Start: 2022-05-05 | End: 2022-05-06 | Stop reason: HOSPADM

## 2022-05-05 RX ADMIN — ACETAMINOPHEN 650 MG: 325 TABLET, FILM COATED ORAL at 11:05

## 2022-05-05 RX ADMIN — KETOROLAC TROMETHAMINE 30 MG: 30 INJECTION, SOLUTION INTRAMUSCULAR at 12:05

## 2022-05-05 RX ADMIN — KETOROLAC TROMETHAMINE 30 MG: 30 INJECTION, SOLUTION INTRAMUSCULAR at 05:05

## 2022-05-05 RX ADMIN — IBUPROFEN 800 MG: 400 TABLET, FILM COATED ORAL at 05:05

## 2022-05-05 RX ADMIN — ACETAMINOPHEN 650 MG: 325 TABLET, FILM COATED ORAL at 05:05

## 2022-05-05 RX ADMIN — PRENATAL VIT W/ FE FUMARATE-FA TAB 27-0.8 MG 1 TABLET: 27-0.8 TAB at 08:05

## 2022-05-05 RX ADMIN — OXYCODONE 5 MG: 5 TABLET ORAL at 08:05

## 2022-05-05 RX ADMIN — DOCUSATE SODIUM 200 MG: 100 CAPSULE, LIQUID FILLED ORAL at 08:05

## 2022-05-05 RX ADMIN — OXYCODONE 10 MG: 5 TABLET ORAL at 12:05

## 2022-05-05 RX ADMIN — OXYCODONE 10 MG: 5 TABLET ORAL at 11:05

## 2022-05-05 RX ADMIN — OXYCODONE 10 MG: 5 TABLET ORAL at 04:05

## 2022-05-05 RX ADMIN — ACETAMINOPHEN 650 MG: 325 TABLET, FILM COATED ORAL at 12:05

## 2022-05-05 RX ADMIN — DOCUSATE SODIUM 200 MG: 100 CAPSULE, LIQUID FILLED ORAL at 10:05

## 2022-05-05 RX ADMIN — KETOROLAC TROMETHAMINE 30 MG: 30 INJECTION, SOLUTION INTRAMUSCULAR at 11:05

## 2022-05-05 NOTE — ANESTHESIA POSTPROCEDURE EVALUATION
Anesthesia Post Evaluation    Patient: Noemí Elias    Procedure(s) Performed: Procedure(s) (LRB):   SECTION (N/A)    Final Anesthesia Type: spinal      Patient location during evaluation: labor & delivery  Patient participation: Yes- Able to Participate  Level of consciousness: awake and alert  Post-procedure vital signs: reviewed and stable  Pain management: adequate  Airway patency: patent    PONV status at discharge: No PONV  Anesthetic complications: no      Cardiovascular status: blood pressure returned to baseline and stable  Respiratory status: room air, unassisted and spontaneous ventilation  Hydration status: euvolemic  Follow-up not needed.          Vitals Value Taken Time   /53 22 0831   Temp 36.7 °C (98 °F) 22 0831   Pulse 55 22 0831   Resp 18 22 0831   SpO2 98 % 22         Event Time   Out of Recovery 19:20:00         Pain/Bar Score: Pain Rating Prior to Med Admin: 7 (2022 11:45 AM)  Pain Rating Post Med Admin: 2 (2022  9:15 AM)

## 2022-05-05 NOTE — PROGRESS NOTES
POSTPARTUM PROGRESS NOTE    Subjective:     PPD/POD#: 1   Procedure: Repeat LTCS   EGA: 39w0d   N/V: No   F/C: No   Abd Pain: Mild, well-controlled with oral pain medication   Lochia: Mild   Voiding: Yes   Ambulating: Yes   Bowel fnc: Yes   Breastfeeding: Yes   Contraception: Per primary OB   Circumcision: Consented.  Needs to be examined by OB attending.     Objective:      Temp:  [97.7 °F (36.5 °C)-98 °F (36.7 °C)] 98 °F (36.7 °C)  Pulse:  [48-76] 54  Resp:  [16-18] 18  SpO2:  [97 %-100 %] 97 %  BP: ()/(54-67) 111/61    Lung: Normal respiratory effort   Abdomen: Soft, appropriately tender   Uterus: Firm, no fundal tenderness   Incision: Bandage in place without shadowing   : Deferred   Extremities: Bilateral trace edema     Lab Review    No results for input(s): NA, K, CL, CO2, BUN, CREATININE, GLU, PROT, BILITOT, ALKPHOS, ALT, AST, MG, PHOS in the last 168 hours.    Recent Labs   Lab 05/04/22  1247   WBC 7.42   HGB 11.3*   HCT 33.1*   MCV 89            I/O    Intake/Output Summary (Last 24 hours) at 5/5/2022 0603  Last data filed at 5/5/2022 0000  Gross per 24 hour   Intake 800 ml   Output 1330 ml   Net -530 ml        Assessment and Plan:   Postpartum care:  - Patient doing well.  - Continue routine management and advances.    Juanita Winslow MD/MPH  OB/GYN PGY-1   [facility]

## 2022-05-05 NOTE — DISCHARGE SUMMARY
Delivery Discharge Summary  Obstetrics      Primary OB Clinician: Trevor Wu MD      Admission date: 2022  Discharge date: 2022    Disposition: To home, self care    Discharge Diagnosis List:      Patient Active Problem List   Diagnosis    Syphilis affecting pregnancy in first trimester    S/P  section       Procedure: , due to repeat    Hospital Course:  Noemí Elias is a 26 y.o. now , POD #2 who was admitted on 2022 at 37w1d for rLTCS. Patient was subsequently admitted to labor and delivery unit with signed consents.     Decision was made to proceed with delivery via  which was performed without complications.    Please see delivery note for further details. Her postpartum course was uncomplicated. On discharge day, patient's pain is controlled with oral pain medications. Pt is tolerating ambulation without SOB or CP, and regular diet without N/V. Reports lochia is mild. Denies any HA, vision changes, F/C, LE swelling. Denies any breast pain/soreness.    Pt in stable condition and ready for discharge. She has been instructed to start and/or continue medications and follow up with her obstetrics provider as listed below.    Pertinent studies:  CBC  Recent Labs   Lab 22  1247 22  1343   WBC 7.42 14.65*   HGB 11.3* 10.9*   HCT 33.1* 32.4*   MCV 89 89    309          Immunization History   Administered Date(s) Administered    Tdap 2019        Delivery:    Episiotomy:     Lacerations:     Repair suture:     Repair # of packets:     Blood loss (ml):       Birth information:  YOB: 2023   Time of birth: 4:17 PM   Sex: male   Delivery type: , Low Transverse   Gestational Age: 39w0d    Delivery Clinician:      Other providers:       Additional  information:  Forceps:    Vacuum:    Breech:    Observed anomalies      Living?:           APGARS  One minute Five minutes Ten minutes   Skin color:         Heart rate:          Grimace:         Muscle tone:         Breathing:         Totals: 9  9        Placenta: Delivered:       appearance      Patient Instructions:   Current Discharge Medication List      START taking these medications    Details   ibuprofen (ADVIL,MOTRIN) 800 MG tablet Take 1 tablet (800 mg total) by mouth every 8 (eight) hours as needed for Pain (Take scheduled for next 5-7 days, then as needed for pain).  Qty: 60 tablet, Refills: 1      oxyCODONE (ROXICODONE) 5 MG immediate release tablet Take 1 tablet (5 mg total) by mouth every 4 (four) hours as needed for Pain.  Qty: 15 tablet, Refills: 0      senna-docusate 8.6-50 mg (PERICOLACE) 8.6-50 mg per tablet Take 1 tablet by mouth nightly as needed for Constipation.  Qty: 30 tablet, Refills: 2         CONTINUE these medications which have NOT CHANGED    Details   famotidine (PEPCID) 20 MG tablet Take 1 tablet (20 mg total) by mouth 2 (two) times daily.  Qty: 60 tablet, Refills: 11      promethazine (PHENERGAN) 25 MG tablet Take 1 tablet (25 mg total) by mouth every 6 (six) hours as needed for Nausea.  Qty: 30 tablet, Refills: 1         STOP taking these medications       prenatal vit 10-iron-folic-dha (VITAFOL-OB+DHA) 65-1-250 mg Cmpk Comments:   Reason for Stopping:               Discharge Procedure Orders   BREAST PUMP FOR HOME USE     Order Specific Question Answer Comments   Type of pump: Electric    Weight: 108 kg (238 lb 1.6 oz)    Length of need (1-99 months): 99      Diet Adult Regular     Diet Adult Regular     Diet Adult Regular     Lifting restrictions   Order Comments: No lifting anything larger than baby for 6 weeks     No driving until:   Order Comments: No driving while taking narcotics     Pelvic Rest   Order Comments: Until seen in clinic     Notify your health care provider if you experience any of the following:  temperature >100.4     Notify your health care provider if you experience any of the following:  persistent nausea and vomiting or  diarrhea     Notify your health care provider if you experience any of the following:  severe uncontrolled pain     Notify your health care provider if you experience any of the following:  redness, tenderness, or signs of infection (pain, swelling, redness, odor or green/yellow discharge around incision site)     Notify your health care provider if you experience any of the following:  difficulty breathing or increased cough     Notify your health care provider if you experience any of the following:  severe persistent headache     Notify your health care provider if you experience any of the following:  persistent dizziness, light-headedness, or visual disturbances     Sponge bath only until clinic visit     Keep surgical extremity elevated     Ice to affected area     Lifting restrictions     Other restrictions (specify):   Order Comments: Notify MD if bleeding 1 pad/hour for 2 consecutive hours.     No driving until:   Order Comments: Do not drive while taking narcotics.     Pelvic Rest   Order Comments: Pelvic rest until cleared by MD. Nothing in vagina till cleared by MD including tampons, douching, intercourse     No dressing needed     Notify your health care provider if you experience any of the following:  temperature >100.4     Notify your health care provider if you experience any of the following:  persistent nausea and vomiting or diarrhea     Notify your health care provider if you experience any of the following:  severe uncontrolled pain     Notify your health care provider if you experience any of the following:  redness, tenderness, or signs of infection (pain, swelling, redness, odor or green/yellow discharge around incision site)     Notify your health care provider if you experience any of the following:  difficulty breathing or increased cough     Notify your health care provider if you experience any of the following:  severe persistent headache     Notify your health care provider if you  experience any of the following:  worsening rash     Notify your health care provider if you experience any of the following:  persistent dizziness, light-headedness, or visual disturbances     Notify your health care provider if you experience any of the following:  increased confusion or weakness     Notify your health care provider if you experience any of the following:   Order Comments: Notify MD if bleeding 1 pad/hour for 2 consecutive hours.     Sponge bath only until clinic visit     Keep surgical extremity elevated     Ice to affected area     Lifting restrictions     Other restrictions (specify):   Order Comments: Notify MD if bleeding 1 pad/hour for 2 consecutive hours.     No driving until:   Order Comments: Do not drive while taking narcotics.     Pelvic Rest   Order Comments: Pelvic rest until cleared by MD. Nothing in vagina till cleared by MD including tampons, douching, intercourse     No dressing needed     Notify your health care provider if you experience any of the following:  temperature >100.4     Notify your health care provider if you experience any of the following:  persistent nausea and vomiting or diarrhea     Notify your health care provider if you experience any of the following:  severe uncontrolled pain     Notify your health care provider if you experience any of the following:  redness, tenderness, or signs of infection (pain, swelling, redness, odor or green/yellow discharge around incision site)     Notify your health care provider if you experience any of the following:  difficulty breathing or increased cough     Notify your health care provider if you experience any of the following:  severe persistent headache     Notify your health care provider if you experience any of the following:  worsening rash     Notify your health care provider if you experience any of the following:  persistent dizziness, light-headedness, or visual disturbances     Notify your health care provider  if you experience any of the following:  increased confusion or weakness     Notify your health care provider if you experience any of the following:   Order Comments: Notify MD if bleeding 1 pad/hour for 2 consecutive hours.     Activity as tolerated     Shower on day dressing removed (No bath)     Weight bearing restrictions (specify):     Activity as tolerated     Shower on day dressing removed (No bath)     Weight bearing restrictions (specify):        Follow-up Information     Trevor Wu MD Follow up in 6 week(s).    Specialty: Obstetrics and Gynecology  Why: Postpartum Follow-Up  Contact information:  [address]  [address] [place] [address] [phone]                          Yolande Mcdowell MD  OBGYN PGY-1

## 2022-05-05 NOTE — CARE UPDATE
In to check on patient. Overall she is ambulating, voiding and tolerating oral diet. No N/V.     She notes she was up all night with pain. She notes her pain is not well controlled. Review of MAR shows she didn't get any narcotics until 8:30 AM when she got 5 mg tablet. She just got a 10 mg tablet around 12:30 pm today.    Temp:  [97.7 °F (36.5 °C)-98 °F (36.7 °C)] 98 °F (36.7 °C)  Pulse:  [48-74] 55  Resp:  [16-18] 17  SpO2:  [97 %-100 %] 98 %  BP: ()/(53-67) 109/53  Incision has mild shadowing but abdomen is soft with appropriate tenderness.    I suspect she is behind on pain control. Continue oxycodone 10 mg IR every 4 hours PRN. If no improvement, I asked her to call RN to speak with residents on call to adjust pain medications.   Continue tylenol and ibuprofen.    Trevor Wu  5/5/2022

## 2022-05-05 NOTE — LACTATION NOTE
Mom educated on use of donor milk to supplement, rather than formula (patient did not like to use the formula). Donor milk consents signed and infant received 5 mL.     Mom also requested breast pump.[employer] pump, tubing, collections containers and labels brought to bedside.  Discussed proper pump setting of initiation phase.  Instructed on proper usage of pump and to adjust suction according to maximum comfort level.  Verified appropriate flange fit.  Educated on the frequency and duration of pumping in order to promote and maintain a full milk supply.  Hands on pumping technique reviewed.  Encouraged hand expression after pumping.  Instructed on cleaning of breast pump parts.  Written instructions also given.  Pt verbalized understanding and appropriate recall for proper milk handling, collection, labeling, storage and transportation.

## 2022-05-05 NOTE — PLAN OF CARE
Patient safety maintained, side rails up, bed low and locked position. Pt ambulating and voiding independently.  Pain well controlled with PRN pain medication. Fundus midline, firm, with moderate lochia. Patient responding to infant cues. Incision site dressed - small dried drainage. Significant other at bedside and assisting in patient's care. Will continue to monitor.

## 2022-05-06 VITALS
TEMPERATURE: 98 F | OXYGEN SATURATION: 100 % | DIASTOLIC BLOOD PRESSURE: 75 MMHG | BODY MASS INDEX: 39.67 KG/M2 | WEIGHT: 238.13 LBS | HEIGHT: 65 IN | SYSTOLIC BLOOD PRESSURE: 117 MMHG | HEART RATE: 58 BPM | RESPIRATION RATE: 17 BRPM

## 2022-05-06 PROCEDURE — 25000003 PHARM REV CODE 250: Performed by: OBSTETRICS & GYNECOLOGY

## 2022-05-06 PROCEDURE — 99232 PR SUBSEQUENT HOSPITAL CARE,LEVL II: ICD-10-PCS | Mod: ,,, | Performed by: OBSTETRICS & GYNECOLOGY

## 2022-05-06 PROCEDURE — 25000003 PHARM REV CODE 250: Performed by: STUDENT IN AN ORGANIZED HEALTH CARE EDUCATION/TRAINING PROGRAM

## 2022-05-06 PROCEDURE — 99232 SBSQ HOSP IP/OBS MODERATE 35: CPT | Mod: ,,, | Performed by: OBSTETRICS & GYNECOLOGY

## 2022-05-06 PROCEDURE — 25000003 PHARM REV CODE 250

## 2022-05-06 RX ADMIN — IBUPROFEN 800 MG: 400 TABLET, FILM COATED ORAL at 10:05

## 2022-05-06 RX ADMIN — OXYCODONE 10 MG: 5 TABLET ORAL at 08:05

## 2022-05-06 RX ADMIN — PRENATAL VIT W/ FE FUMARATE-FA TAB 27-0.8 MG 1 TABLET: 27-0.8 TAB at 08:05

## 2022-05-06 RX ADMIN — DOCUSATE SODIUM 200 MG: 100 CAPSULE, LIQUID FILLED ORAL at 08:05

## 2022-05-06 RX ADMIN — OXYCODONE 10 MG: 5 TABLET ORAL at 02:05

## 2022-05-06 RX ADMIN — IBUPROFEN 800 MG: 400 TABLET, FILM COATED ORAL at 02:05

## 2022-05-06 NOTE — PLAN OF CARE
Based on pt's stated feeding goals of exclusively pumping, the Plan of care for today is for pt to do skin-to-skin, to feed frequently on demand, to use paced bottle feeding technique, to monitor voids and stools, and to call for assistance. Pt to double pump 8 or more times in 24 hour period, using proper hand hygiene, setup, storage, and cleaning. Pt to use techniques to maximize milk production, such as warm compresses, frequent skin to skin and, utilize hand expression after pumping.  Pt verbalizes understanding and questions answered.

## 2022-05-06 NOTE — PROGRESS NOTES
POSTPARTUM PROGRESS NOTE    Subjective:     PPD/POD#: 2   Procedure: Repeat LTCS   EGA: 39w0d   N/V: No   F/C: No   Abd Pain: Mild, well-controlled with oral pain medication   Lochia: Mild   Voiding: Yes   Ambulating: Yes   Bowel fnc: Yes   Breastfeeding: Yes   Contraception: Per primary OB   Circumcision: Consented.  Needs to be examined by OB attending.     Objective:      Temp:  [97.8 °F (36.6 °C)-98.5 °F (36.9 °C)] 98.5 °F (36.9 °C)  Pulse:  [52-70] 58  Resp:  [17-19] 18  SpO2:  [98 %-100 %] 100 %  BP: (105-119)/(53-80) 119/80    Lung: Normal respiratory effort   Abdomen: Soft, appropriately tender   Uterus: Firm, no fundal tenderness   Incision: Bandage in place minimal shadowing (midline), stable   : Deferred   Extremities: Bilateral trace edema     Lab Review    No results for input(s): NA, K, CL, CO2, BUN, CREATININE, GLU, PROT, BILITOT, ALKPHOS, ALT, AST, MG, PHOS in the last 168 hours.    Recent Labs   Lab 05/04/22  1247 05/05/22  1343   WBC 7.42 14.65*   HGB 11.3* 10.9*   HCT 33.1* 32.4*   MCV 89 89    309         I/O    Intake/Output Summary (Last 24 hours) at 5/6/2022 0700  Last data filed at 5/5/2022 1300  Gross per 24 hour   Intake --   Output 850 ml   Net -850 ml        Assessment and Plan:   Postpartum care:  - Patient doing well.  - Continue routine management and advances.    Trichomoniasis on pap smear  -pt asymptomatic  -f/u outpatient    Yolande Mcdowell MD  OBGYN PGY-1

## 2022-05-06 NOTE — NURSING
Notified MD that pt's BP was 105/59 and HR 52. Pt has no signs of distress or discomfort. No further changes at this time.

## 2022-05-06 NOTE — LACTATION NOTE
LC rounds. Pt reports she has pumped x 2 in last 24 hours, concerned about milk supply. Reinforced education on importance of frequent stimulation of breasts with pump to build/maintain milk supply. Pt reports pumping has been painful, encouraged pt to call for flange fit assessment.  number on board. Pt v/u of education and questions answered.

## 2022-05-06 NOTE — PLAN OF CARE
Pt ambulating, voiding, and passing flatus. Pt tolerating PO well and no SS of distress at this time. Pt's pain well controlled throughout shift by oral pain medication. Pts bleeding has been light throughout shift. Fundus is firm. Mother baby care guide reviewed. All questions answered. Pt verbalized understanding to follow up with OB X4 weeks. Medications delivered to bedside. Reviewed medication list, when to call provider, and SS of infection. Pt stable at this time. ID band verified.

## 2022-05-07 ENCOUNTER — TELEPHONE (OUTPATIENT)
Dept: LACTATION | Facility: CLINIC | Age: 27
End: 2022-05-07
Payer: MEDICAID

## 2022-05-07 NOTE — TELEPHONE ENCOUNTER
"Lactation note:  The patient was called to check on progress with breastfeeding and review discharge information as she left last night. Mom states infant is "eating more and pooping more." Mom states breasts are hard and milk is flowing. The baby is latching to feed as mom has not obtained her breast pump. She will get that on Monday. Discussed how to manage engorgement using massage, hand expression, warmth, and cold. Mom using hot showers and hand expression. Baby nursing for 30-45 minutes at a time and sleeps up to 1.5 hours. He is voiding and stooling at most feedings. Urine is light colored and stool getting lighter. Mom can hand express into a bottle to feed baby more milk as needed. Reviewed milk storage guidelines. Reinforced calling Monday for pediatrician appointment and feeding baby frequently until content. Mom had questions regarding her pain medication and incision. Discussed taking medication as prescribed and if pain not improving, mom to call labor and delivery or come to [facility]. Mom told how to check her incision and made aware of warning signs of redness around incision, active bleeding or purulent drainage; she should call her ob/gyn if any of these occurs.  No further questions. Made mom aware of support groups, community resources and lactation phone number.   "

## 2022-05-19 ENCOUNTER — TELEPHONE (OUTPATIENT)
Dept: OBSTETRICS AND GYNECOLOGY | Facility: CLINIC | Age: 27
End: 2022-05-19
Payer: MEDICAID

## 2022-05-19 NOTE — TELEPHONE ENCOUNTER
Spoke to pt in regards to returning to work. Pt states she is depressed and does not want to stay home right now. I advised her to come in to see Dr. Wu. She said she will come Monday 5/23. I advised her if her depression gets worse before then she needs to go to the ER.   Pt verbalized understanding.  ----- Message from Kristie Carpenter sent at 5/18/2022  3:44 PM CDT -----  Regarding: Requesting a call back  Contact: ANDREW GUTIERREZ [[id number]]  Name of Who is Calling:ANDREW GUTIERREZ [[id number]]          What is the request in detail: Pt is requesting a call back in regards to running  to work.   Please advise  she is requesting a call back           Can the clinic reply by [facility]:No           What Number to Call Back if not in [facility]:[phone]

## 2022-05-23 ENCOUNTER — POSTPARTUM VISIT (OUTPATIENT)
Dept: OBSTETRICS AND GYNECOLOGY | Facility: CLINIC | Age: 27
End: 2022-05-23
Payer: MEDICAID

## 2022-05-23 VITALS
DIASTOLIC BLOOD PRESSURE: 76 MMHG | WEIGHT: 232.81 LBS | BODY MASS INDEX: 38.79 KG/M2 | HEIGHT: 65 IN | SYSTOLIC BLOOD PRESSURE: 122 MMHG

## 2022-05-23 DIAGNOSIS — G89.18 POST-OPERATIVE PAIN: ICD-10-CM

## 2022-05-23 DIAGNOSIS — Z98.891 S/P CESAREAN SECTION: Primary | ICD-10-CM

## 2022-05-23 DIAGNOSIS — A59.01 VAGINITIS DUE TO TRICHOMONAS: ICD-10-CM

## 2022-05-23 PROCEDURE — 99213 OFFICE O/P EST LOW 20 MIN: CPT | Mod: PBBFAC | Performed by: OBSTETRICS & GYNECOLOGY

## 2022-05-23 PROCEDURE — 87801 DETECT AGNT MULT DNA AMPLI: CPT | Performed by: OBSTETRICS & GYNECOLOGY

## 2022-05-23 PROCEDURE — 99999 PR PBB SHADOW E&M-EST. PATIENT-LVL III: CPT | Mod: PBBFAC,,, | Performed by: OBSTETRICS & GYNECOLOGY

## 2022-05-23 PROCEDURE — 99024 POSTOP FOLLOW-UP VISIT: CPT | Mod: ,,, | Performed by: OBSTETRICS & GYNECOLOGY

## 2022-05-23 PROCEDURE — 99024 PR POST-OP FOLLOW-UP VISIT: ICD-10-PCS | Mod: ,,, | Performed by: OBSTETRICS & GYNECOLOGY

## 2022-05-23 PROCEDURE — 99999 PR PBB SHADOW E&M-EST. PATIENT-LVL III: ICD-10-PCS | Mod: PBBFAC,,, | Performed by: OBSTETRICS & GYNECOLOGY

## 2022-05-23 PROCEDURE — 87481 CANDIDA DNA AMP PROBE: CPT | Mod: 59 | Performed by: OBSTETRICS & GYNECOLOGY

## 2022-05-23 RX ORDER — OXYCODONE HYDROCHLORIDE 5 MG/1
5 TABLET ORAL EVERY 6 HOURS PRN
Qty: 8 TABLET | Refills: 0 | Status: SHIPPED | OUTPATIENT
Start: 2022-05-23

## 2022-05-23 NOTE — PROGRESS NOTES
POSTPARTUM VISIT  2022    CHIEF COMPLAINT:     Chief Complaint   Patient presents with    Postpartum Care        HISTORY OF PRESENT ILLNESS:    Pt is a 26 y.o.   here for postpartum visit.  She has concerns with her mood. She feels her mood is off mostly due to not being able to go out of the house and go to work.  She is s/p  section on 22 now POD 19.    Date of delivery: 2022  at 4:17 PM    Viable   Information for the patient's :  Olayinka Elias [[id number]]   6 lb 10.2 oz (3010 g)    with Apgars 9 /9     She is sleeping ok.  She is bottle feeding. Mood off as above. She denies any homicidal or suicidal ideation.   Her lochia is off and on.   She denies fevers, chills, no breast problems or signs of mastitis. She is voiding normally and having normal bowel movements. She has not engaged in sexual activity. She does endorse some pain above her incision site. Denies bleeding or drainage from incision. She has been taking Motrin which improves her pain but sometimes doesn't cover all of her pain.    I discussed with the patient all contraceptive options including:  OCP (oral contraceptives), Ortho Evra, Nuvaring, Depo Provera, IUD (intrauterine device)- both Mirena and Paragard, Nexplanon, condoms and diaphragm.  She is now interested in still thinking about her options.     No LMP recorded.    Review of patient's allergies indicates:   Allergen Reactions    Zithromax [azithromycin]      Current Outpatient Medications on File Prior to Visit   Medication Sig Dispense Refill    ibuprofen (ADVIL,MOTRIN) 800 MG tablet Take 1 tablet (800 mg total) by mouth every 8 (eight) hours as needed for Pain (Take scheduled for next 5-7 days, then as needed for pain). 60 tablet 1    famotidine (PEPCID) 20 MG tablet Take 1 tablet (20 mg total) by mouth 2 (two) times daily. (Patient not taking: Reported on 2022) 60 tablet 11    promethazine (PHENERGAN) 25 MG tablet Take 1 tablet (25 mg  "total) by mouth every 6 (six) hours as needed for Nausea. (Patient not taking: Reported on 2022) 30 tablet 1    senna-docusate 8.6-50 mg (PERICOLACE) 8.6-50 mg per tablet Take 1 tablet by mouth nightly as needed for Constipation. (Patient not taking: Reported on 2022) 30 tablet 2     No current facility-administered medications on file prior to visit.       History reviewed. No pertinent past medical history.         Past Surgical History:   Procedure Laterality Date     SECTION N/A 3/30/2019    Procedure:  SECTION;  Surgeon: Flor Kuhn MD;  [facility];  Service: OB/GYN;  Laterality: N/A;     SECTION N/A 2022    Procedure:  SECTION;  Surgeon: Trevor Wu MD;  Location: [facility];  Service: OB/GYN;  Laterality: N/A;       Social History     Socioeconomic History    Marital status: Single   Tobacco Use    Smoking status: Former Smoker    Smokeless tobacco: Former User   Substance and Sexual Activity    Alcohol use: No    Drug use: Yes     Types: Marijuana    Sexual activity: Yes     Partners: Male                      Family History   Problem Relation Age of Onset    Breast cancer Mother     Colon cancer Neg Hx     Ovarian cancer Neg Hx        OB History        2    Para   2    Term   2            AB        Living   2       SAB        IAB        Ectopic        Multiple   0    Live Births   2               GYNECOLOGIC HISTORY:     Last pap 12/3/21 NILM. History of trichomonas.     REVIEW OF SYSTEMS:    Negative except as above.     PHYSICAL EXAM  /76   Ht 5' 5" (1.651 m)   Wt 105.6 kg (232 lb 12.9 oz)   BMI 38.74 kg/m²   GENERAL APPEARANCE:  The patient is a pleasant, normal appearing female with normal affect and in no distress.  ABDOMEN: Soft, mildly tender above incision site, non-distended. No drainage, firmness, signs of hematoma. Incision overall is c/d/i  SKIN:  Warm and dry to touch.  No lesions or rashes " noted.    PSYCHIATRIC/NEUROLOGIC:  Appropriate mood and affect, normal recall, alert and oriented x 3  EXTREMITIES:  Warm and well perfused.  No edema noted.    :  Vulva: Inspection of her external genitalia reveals normal mons pubis, labia minora and labia majora.  Normal appearing clitoris, urethral meatus and [place]'s glands.    Vagina:  Speculum exam reveals pink and moist vaginal mucosa.    Cervix:  Cervix is normal in appearance with no lesions.    Perineum:  Perineum appears normal.  Anus:  Normal with no apparent lesions      ASSESSMENT:  Normal postpartum exam  Contraceptive counseling  S/p  section now POD 19      PLAN:  She notes she is a  and she sits most of the day. I did let her know she can return to work with restrictions of no lifting more than 10 lbs, no more than 6-8 hours straight and no more than 40 hours per week.   Incision is c/d/i. I suspect her pain is from post op pain. Oxycodone 5 mg every 8 hours PRN for 8 more tabs prescribed.   Precautions given for postpartum depression  History of trichomonas: Vaginosis swab collected today    Follow up in 4 weeks for postpartum check    Trevor Wu  2022

## 2022-05-23 NOTE — LETTER
May 23, 2022      [facility]  [address] [address]  Phone: [phone]  Fax: [phone]       Patient: Noemí Elias   YOB: 1995  Date of Visit: 05/23/2022    To Whom It May Concern:    Raissa Elias  was at [facility] on 05/23/2022. The patient may return to work/school on 5/25/22 with restrictions including no lifting more than 10 pounds, no working more than 6-8 hours straight and working no more than 40 hours per week. If you have any questions or concerns, or if I can be of further assistance, please do not hesitate to contact me.    Sincerely,    Trevor Wu MD

## 2022-05-25 LAB
BACTERIAL VAGINOSIS DNA: NEGATIVE
CANDIDA GLABRATA DNA: NEGATIVE
CANDIDA KRUSEI DNA: NEGATIVE
CANDIDA RRNA VAG QL PROBE: NEGATIVE
T VAGINALIS RRNA GENITAL QL PROBE: POSITIVE

## 2022-05-25 RX ORDER — METRONIDAZOLE 500 MG/1
1000 TABLET ORAL EVERY 12 HOURS
Qty: 56 TABLET | Refills: 0 | Status: SHIPPED | OUTPATIENT
Start: 2022-05-25 | End: 2022-06-08

## 2022-06-01 ENCOUNTER — TELEPHONE (OUTPATIENT)
Dept: OBSTETRICS AND GYNECOLOGY | Facility: CLINIC | Age: 27
End: 2022-06-01
Payer: MEDICAID

## 2022-06-01 NOTE — TELEPHONE ENCOUNTER
Attempted to call patient to review results. Her mother answered. I asked her to have the patient call me or check on the [facility] portal to see her results.    Trevor uW

## 2022-06-02 ENCOUNTER — TELEPHONE (OUTPATIENT)
Dept: OBSTETRICS AND GYNECOLOGY | Facility: CLINIC | Age: 27
End: 2022-06-02
Payer: MEDICAID

## 2022-06-02 NOTE — TELEPHONE ENCOUNTER
Attempted to call patient to review test results. No answer so voicemail left asking her to call my office and let me know when a good time to reach her would be.    Trevor Wu  6/2/2022

## 2022-06-06 ENCOUNTER — TELEPHONE (OUTPATIENT)
Dept: OBSTETRICS AND GYNECOLOGY | Facility: CLINIC | Age: 27
End: 2022-06-06
Payer: MEDICAID

## 2022-06-06 NOTE — TELEPHONE ENCOUNTER
Attempted to call patient to review test results. No answer so voicemail left asking her to call my office and let me know when a good time to reach her would be.    Trevor Wu  6/6/2022

## 2022-06-09 ENCOUNTER — TELEPHONE (OUTPATIENT)
Dept: OBSTETRICS AND GYNECOLOGY | Facility: CLINIC | Age: 27
End: 2022-06-09
Payer: MEDICAID

## 2022-06-10 ENCOUNTER — TELEPHONE (OUTPATIENT)
Dept: OBSTETRICS AND GYNECOLOGY | Facility: CLINIC | Age: 27
End: 2022-06-10
Payer: MEDICAID

## 2022-06-10 NOTE — TELEPHONE ENCOUNTER
ID confirmed.    Your trichomonas test was still POSITIVE. Because it has been positive even in the setting of standard treatment doses, I would recommend a longer course of antibiotics and a higher dose. I would like you to take Flagyl 1000 mg twice daily for 14 days. After you complete this medication, I would like to see you back in the office about 4-6 weeks after. Do not drink alcohol with this medication. Please avoid intercourse until we can test to make sure the infection is gone. Please encourage your partner to get tested and treated by their doctor.     I asked her to call to schedule an appointment with me in about 6 weeks. She notes she will do this.    Trevor Wu

## 2022-06-17 ENCOUNTER — PATIENT MESSAGE (OUTPATIENT)
Dept: ADMINISTRATIVE | Facility: HOSPITAL | Age: 27
End: 2022-06-17
Payer: MEDICAID